# Patient Record
Sex: FEMALE | Race: WHITE | NOT HISPANIC OR LATINO | Employment: OTHER | URBAN - METROPOLITAN AREA
[De-identification: names, ages, dates, MRNs, and addresses within clinical notes are randomized per-mention and may not be internally consistent; named-entity substitution may affect disease eponyms.]

---

## 2017-01-14 ENCOUNTER — TELEPHONE (OUTPATIENT)
Dept: FAMILY MEDICINE | Facility: OTHER | Age: 57
End: 2017-01-14

## 2017-01-14 NOTE — TELEPHONE ENCOUNTER
Patient called this morning, says that the pharmacy needs authorization for her prescription.  She hasn't heard anything since Tuesday.    Please contact this patient.  Thank you!  Ny DUQUE  Central Scheduler

## 2017-01-16 NOTE — TELEPHONE ENCOUNTER
I am assuming this is regarding the Effexor. Please see encounter from 1/10/17.    Rahel Fitch, RN, BSN

## 2017-01-19 NOTE — PROGRESS NOTES
"  SUBJECTIVE:                                                    Leia Kingston is a 56 year old female who presents to clinic today for the following health issues:  {Provider please address medication reconciliation discrepancies--rooming staff please delete if no med/rec issues}    HPI    {additional problems for roomer to add, delete if none:083790}    Problem list and histories reviewed & adjusted, as indicated.  Additional history: {NONE - AS DOCUMENTED:986288::\"as documented\"}    {ACUTE Problem SUPERLIST - brief histories:445952}    {HIST REVIEW/ LINKS 2:556478}    {PROVIDER CHARTING PREFERENCE:965953}  "

## 2017-01-24 ENCOUNTER — OFFICE VISIT (OUTPATIENT)
Dept: FAMILY MEDICINE | Facility: OTHER | Age: 57
End: 2017-01-24
Payer: COMMERCIAL

## 2017-01-24 VITALS
RESPIRATION RATE: 16 BRPM | BODY MASS INDEX: 27.56 KG/M2 | HEIGHT: 61 IN | WEIGHT: 146 LBS | DIASTOLIC BLOOD PRESSURE: 88 MMHG | SYSTOLIC BLOOD PRESSURE: 134 MMHG | TEMPERATURE: 97.5 F | HEART RATE: 60 BPM

## 2017-01-24 DIAGNOSIS — Z00.00 ENCOUNTER FOR ROUTINE ADULT HEALTH EXAMINATION WITHOUT ABNORMAL FINDINGS: Primary | ICD-10-CM

## 2017-01-24 DIAGNOSIS — Z11.59 NEED FOR HEPATITIS C SCREENING TEST: ICD-10-CM

## 2017-01-24 DIAGNOSIS — N95.1 MENOPAUSAL SYNDROME (HOT FLASHES): ICD-10-CM

## 2017-01-24 LAB
CHOLEST SERPL-MCNC: 168 MG/DL
GLUCOSE SERPL-MCNC: 95 MG/DL (ref 70–99)
HCV AB SERPL QL IA: NORMAL
HDLC SERPL-MCNC: 45 MG/DL
LDLC SERPL CALC-MCNC: 94 MG/DL
NONHDLC SERPL-MCNC: 123 MG/DL
TRIGL SERPL-MCNC: 144 MG/DL
TSH SERPL DL<=0.005 MIU/L-ACNC: 1.69 MU/L (ref 0.4–4)

## 2017-01-24 PROCEDURE — 99396 PREV VISIT EST AGE 40-64: CPT | Performed by: FAMILY MEDICINE

## 2017-01-24 PROCEDURE — 80061 LIPID PANEL: CPT | Performed by: FAMILY MEDICINE

## 2017-01-24 PROCEDURE — 86803 HEPATITIS C AB TEST: CPT | Performed by: FAMILY MEDICINE

## 2017-01-24 PROCEDURE — 84443 ASSAY THYROID STIM HORMONE: CPT | Performed by: FAMILY MEDICINE

## 2017-01-24 PROCEDURE — 36415 COLL VENOUS BLD VENIPUNCTURE: CPT | Performed by: FAMILY MEDICINE

## 2017-01-24 PROCEDURE — 82947 ASSAY GLUCOSE BLOOD QUANT: CPT | Performed by: FAMILY MEDICINE

## 2017-01-24 RX ORDER — VENLAFAXINE HYDROCHLORIDE 75 MG/1
75 CAPSULE, EXTENDED RELEASE ORAL DAILY
Qty: 90 CAPSULE | Refills: 3 | Status: SHIPPED | OUTPATIENT
Start: 2017-01-24 | End: 2018-02-01

## 2017-01-24 ASSESSMENT — ANXIETY QUESTIONNAIRES
3. WORRYING TOO MUCH ABOUT DIFFERENT THINGS: NOT AT ALL
2. NOT BEING ABLE TO STOP OR CONTROL WORRYING: NOT AT ALL
1. FEELING NERVOUS, ANXIOUS, OR ON EDGE: NOT AT ALL
7. FEELING AFRAID AS IF SOMETHING AWFUL MIGHT HAPPEN: NOT AT ALL
GAD7 TOTAL SCORE: 0
5. BEING SO RESTLESS THAT IT IS HARD TO SIT STILL: NOT AT ALL
6. BECOMING EASILY ANNOYED OR IRRITABLE: NOT AT ALL

## 2017-01-24 ASSESSMENT — PATIENT HEALTH QUESTIONNAIRE - PHQ9: 5. POOR APPETITE OR OVEREATING: NOT AT ALL

## 2017-01-24 NOTE — PROGRESS NOTES
SUBJECTIVE:     CC: Leia Kingston is an 56 year old woman who presents for preventive health visit.     Physical  Annual:     Getting at least 3 servings of Calcium per day::  Yes    Bi-annual eye exam::  Yes    Dental care twice a year::  Yes    Sleep apnea or symptoms of sleep apnea::  Excessive snoring    Diet::  Regular (no restrictions)    Frequency of exercise::  None    Taking medications regularly::  Yes    Medication side effects::  None    Additional concerns today::  No    Today's PHQ-2 Score:   PHQ-2 ( 1999 Pfizer) 11/18/2015   Q1: Little interest or pleasure in doing things 0   Q2: Feeling down, depressed or hopeless 0   PHQ-2 Score 0       Abuse: Current or Past(Physical, Sexual or Emotional)- No  Do you feel safe in your environment - Yes    Social History   Substance Use Topics     Smoking status: Never Smoker      Smokeless tobacco: Not on file      Comment: no smokers in household     Alcohol Use: Yes      Comment: beer or wine x3/,1 x per month     The patient does not drink >3 drinks per day nor >7 drinks per week.    Recent Labs   Lab Test  11/18/15   1107  07/09/10   1021   CHOL  200*  221*   HDL  71  56   LDL  110*  138*   TRIG  96  136   CHOLHDLRATIO   --   4.0   NHDL  129   --        Reviewed orders with patient.  Reviewed health maintenance and updated orders accordingly - Yes    Mammo Decision Support:  Patient over age 50, mutual decision to screen reflected in health maintenance.    Pertinent mammograms are reviewed under the imaging tab.  History of abnormal Pap smear: NO - age 30-65 PAP every 5 years with negative HPV co-testing recommended  All Histories reviewed and updated in Epic.    ROS:  C: NEGATIVE for fever, chills, change in weight  I: NEGATIVE for worrisome rashes, moles or lesions  E: NEGATIVE for vision changes or irritation  ENT: NEGATIVE for ear, mouth and throat problems  R: NEGATIVE for significant cough or SOB  B: NEGATIVE for masses, tenderness or  "discharge  CV: NEGATIVE for chest pain, palpitations or peripheral edema  GI: NEGATIVE for nausea, abdominal pain, heartburn, or change in bowel habits  : NEGATIVE for unusual urinary or vaginal symptoms. No vaginal bleeding.  M: NEGATIVE for significant arthralgias or myalgia  N: NEGATIVE for weakness, dizziness or paresthesias  P: NEGATIVE for changes in mood or affect     OBJECTIVE:     /88 mmHg  Pulse 60  Temp(Src) 97.5  F (36.4  C) (Temporal)  Resp 16  Ht 5' 0.98\" (1.549 m)  Wt 146 lb (66.225 kg)  BMI 27.60 kg/m2  EXAM:  GENERAL: healthy, alert and no distress  EYES: Eyes grossly normal to inspection, PERRL and conjunctivae and sclerae normal  HENT: ear canals and TM's normal, nose and mouth without ulcers or lesions  NECK: no adenopathy, no asymmetry, masses, or scars and thyroid normal to palpation  RESP: lungs clear to auscultation - no rales, rhonchi or wheezes  BREAST: normal without masses, tenderness or nipple discharge and no palpable axillary masses or adenopathy.  Radiation blue tattoo marks seen.  CV: regular rate and rhythm, normal S1 S2,  no murmur, no peripheral edema   ABDOMEN: soft, nontender, no hepatosplenomegaly, no masses and bowel sounds normal  MS: no gross musculoskeletal defects noted, no edema  SKIN: no suspicious lesions or rashes  NEURO: Normal strength and tone, mentation intact and speech normal  PSYCH: mentation appears normal, affect normal/bright    ASSESSMENT/PLAN:     1. Encounter for routine adult health examination without abnormal findings    - Lipid panel reflex to direct LDL  - Glucose  - TSH with free T4 reflex    2. Need for hepatitis C screening test    - Hepatitis C Screen Reflex to HCV RNA Quant and Genotype    3. Menopausal syndrome (hot flashes)  Hot flashes slowly improving, but still there and not fun.  Sometimes she gets nauseated with them.  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  Discussed that Effexor could elevate BP as well.  We decided to have additional readings before deciding whether to decrease Effexor or add a BP medication.  She will try to have a couple readings done in the next month.    - venlafaxine (EFFEXOR-XR) 75 MG 24 hr capsule; Take 1 capsule (75 mg) by mouth daily  Dispense: 90 capsule; Refill: 3    COUNSELING:  Reviewed preventive health counseling, as reflected in patient instructions    BP Screening:   Last 3 BP Readings:    BP Readings from Last 3 Encounters:   01/24/17 134/88   11/18/15 122/70   02/14/14 116/78       The following was recommended to the patient:  Re-screen BP within a year and recommended lifestyle modifications       reports that she has never smoked. She does not have any smokeless tobacco history on file.    Estimated body mass index is 27.6 kg/(m^2) as calculated from the following:    Height as of this encounter: 5' 0.98\" (1.549 m).    Weight as of this encounter: 146 lb (66.225 kg).   Weight management plan: Discussed healthy diet and exercise guidelines and patient will follow up in 12 months in clinic to re-evaluate.    Counseling Resources:  ATP IV Guidelines  Pooled Cohorts Equation Calculator  Breast Cancer Risk Calculator  FRAX Risk Assessment  ICSI Preventive Guidelines  Dietary Guidelines for Americans, 2010  USDA's MyPlate  ASA Prophylaxis  Lung CA Screening    Sowmya Gao MD  [facility]    "

## 2017-01-24 NOTE — MR AVS SNAPSHOT
"              After Visit Summary   1/24/2017    Leia Kingston    MRN: [id number]           Patient Information     Date Of Birth          1960        Visit Information        Provider Department      1/24/2017 7:40 AM Sowmya Gao MD [facility]        Today's Diagnoses     Encounter for routine adult health examination without abnormal findings    -  1     Need for hepatitis C screening test         Need for prophylactic vaccination and inoculation against influenza         Menopausal syndrome (hot flashes)            Follow-ups after your visit        Who to contact     If you have questions or need follow up information about today's clinic visit or your schedule please contact [facility] directly at [phone].  Normal or non-critical lab and imaging results will be communicated to you by MyChart, letter or phone within 4 business days after the clinic has received the results. If you do not hear from us within 7 days, please contact the clinic through MyChart or phone. If you have a critical or abnormal lab result, we will notify you by phone as soon as possible.  Submit refill requests through [employer] or call your pharmacy and they will forward the refill request to us. Please allow 3 business days for your refill to be completed.          Additional Information About Your Visit        MyChart Information     [employer] lets you send messages to your doctor, view your test results, renew your prescriptions, schedule appointments and more. To sign up, go to [email or web address] . Click on \"Log in\" on the left side of the screen, which will take you to the Welcome page. Then click on \"Sign up Now\" on the right side of the page.     You will be asked to enter the access code listed below, as well as some personal information. Please follow the directions to create your username and password.     Your access code is: [email or web address]  Expires: 4/24/2017  8:13 AM   " "  Your access code will  in 90 days. If you need help or a new code, please call your [place] clinic or [phone].        Care EveryWhere ID     This is your Care EveryWhere ID. This could be used by other organizations to access your [place] medical records  [id number]        Your Vitals Were     Pulse Temperature Respirations Height BMI (Body Mass Index)       60 97.5  F (36.4  C) (Temporal) 16 5' 0.98\" (1.549 m) 27.60 kg/m2        Blood Pressure from Last 3 Encounters:   17 134/88   11/18/15 122/70   14 116/78    Weight from Last 3 Encounters:   17 146 lb (66.225 kg)   11/18/15 143 lb 12.8 oz (65.227 kg)   14 142 lb (64.411 kg)              We Performed the Following     Glucose     Hepatitis C Screen Reflex to HCV RNA Quant and Genotype     Lipid panel reflex to direct LDL     TSH with free T4 reflex          Today's Medication Changes          These changes are accurate as of: 17  8:13 AM.  If you have any questions, ask your nurse or doctor.               These medicines have changed or have updated prescriptions.        Dose/Directions    venlafaxine 75 MG 24 hr capsule   Commonly known as:  EFFEXOR-XR   This may have changed:  See the new instructions.   Used for:  Menopausal syndrome (hot flashes)   Changed by:  Sowmya Gao MD        Dose:  75 mg   Take 1 capsule (75 mg) by mouth daily   Quantity:  90 capsule   Refills:  3            Where to get your medicines      These medications were sent to [facility]/pharmacy #[id number] - [place], [place] - [address]  [address], [place] [address]     Phone:  [phone]    - venlafaxine 75 MG 24 hr capsule             Primary Care Provider Office Phone # Fax #    Sowmya Gao [phone]       [facility] [address]  [facility] [address]        Thank you!     Thank you for choosing [facility]  for your care. Our goal is always to provide you " with excellent care. Hearing back from our patients is one way we can continue to improve our services. Please take a few minutes to complete the written survey that you may receive in the mail after your visit with us. Thank you!             Your Updated Medication List - Protect others around you: Learn how to safely use, store and throw away your medicines at www.disposemymeds.org.          This list is accurate as of: 1/24/17  8:13 AM.  Always use your most recent med list.                   Brand Name Dispense Instructions for use    Multi-vitamin Tabs tablet   Generic drug:  multivitamin, therapeutic with minerals      ONE TABLET DAILY       tiZANidine 4 MG tablet    ZANAFLEX         traMADol 50 MG tablet    ULTRAM         venlafaxine 75 MG 24 hr capsule    EFFEXOR-XR    90 capsule    Take 1 capsule (75 mg) by mouth daily       VITAMIN D (CHOLECALCIFEROL) PO      Take 2,000 Units by mouth daily

## 2017-01-24 NOTE — NURSING NOTE
"Chief Complaint   Patient presents with     Physical     Panel Management     mychart, height, flu, mammo, honoring choices, hep c       Initial /94 mmHg  Pulse 60  Temp(Src) 97.5  F (36.4  C) (Temporal)  Resp 16  Ht 5' 0.98\" (1.549 m)  Wt 146 lb (66.225 kg)  BMI 27.60 kg/m2 Estimated body mass index is 27.6 kg/(m^2) as calculated from the following:    Height as of this encounter: 5' 0.98\" (1.549 m).    Weight as of this encounter: 146 lb (66.225 kg).  BP completed using cuff size: marilyn Garces CMA    "

## 2017-01-25 ASSESSMENT — PATIENT HEALTH QUESTIONNAIRE - PHQ9: SUM OF ALL RESPONSES TO PHQ QUESTIONS 1-9: 0

## 2017-01-25 ASSESSMENT — ANXIETY QUESTIONNAIRES: GAD7 TOTAL SCORE: 0

## 2017-02-08 ENCOUNTER — ALLIED HEALTH/NURSE VISIT (OUTPATIENT)
Dept: FAMILY MEDICINE | Facility: OTHER | Age: 57
End: 2017-02-08
Payer: COMMERCIAL

## 2017-02-08 VITALS — DIASTOLIC BLOOD PRESSURE: 84 MMHG | SYSTOLIC BLOOD PRESSURE: 132 MMHG

## 2017-02-08 DIAGNOSIS — Z01.30 BLOOD PRESSURE CHECK: Primary | ICD-10-CM

## 2017-02-08 PROCEDURE — 99207 ZZC NO CHARGE NURSE ONLY: CPT | Performed by: FAMILY MEDICINE

## 2017-02-08 NOTE — PROGRESS NOTES
Leia Kingston is enrolled/participating in the retail pharmacy Blood Pressure Goals Achievement Program (BPGAP).  Leia Kingston was evaluated at [facility] on February 8, 2017 at which time her blood pressure was:    BP Readings from Last 3 Encounters:   02/08/17 132/84   01/24/17 134/88   11/18/15 122/70     Leia Kingston is not experiencing symptoms.    Follow-Up: BP is at goal of < 140/90mmHg (patient 18+ years of age with or without diabetes).  Recommended follow-up at pharmacy in 6 months.     Completed by:  Merissa Diamond, Pharm.D., [facility], [phone]

## 2017-02-15 ENCOUNTER — ALLIED HEALTH/NURSE VISIT (OUTPATIENT)
Dept: FAMILY MEDICINE | Facility: OTHER | Age: 57
End: 2017-02-15
Payer: COMMERCIAL

## 2017-02-15 VITALS — SYSTOLIC BLOOD PRESSURE: 130 MMHG | DIASTOLIC BLOOD PRESSURE: 82 MMHG

## 2017-02-15 DIAGNOSIS — Z01.30 BP CHECK: Primary | ICD-10-CM

## 2017-02-15 PROCEDURE — 99207 ZZC NO CHARGE NURSE ONLY: CPT | Performed by: FAMILY MEDICINE

## 2017-02-15 NOTE — MR AVS SNAPSHOT
"              After Visit Summary   2/15/2017    Leia Kingston    MRN: [id number]           Patient Information     Date Of Birth          1960        Visit Information        Provider Department      2/15/2017 2:50 PM Sowmya Gao MD [facility]        Today's Diagnoses     BP check    -  1       Follow-ups after your visit        Who to contact     If you have questions or need follow up information about today's clinic visit or your schedule please contact [facility] directly at [phone].  Normal or non-critical lab and imaging results will be communicated to you by [employer], letter or phone within 4 business days after the clinic has received the results. If you do not hear from us within 7 days, please contact the clinic through [employer] or phone. If you have a critical or abnormal lab result, we will notify you by phone as soon as possible.  Submit refill requests through [employer] or call your pharmacy and they will forward the refill request to us. Please allow 3 business days for your refill to be completed.          Additional Information About Your Visit        MyChart Information     [employer] lets you send messages to your doctor, view your test results, renew your prescriptions, schedule appointments and more. To sign up, go to [email or web address] . Click on \"Log in\" on the left side of the screen, which will take you to the Welcome page. Then click on \"Sign up Now\" on the right side of the page.     You will be asked to enter the access code listed below, as well as some personal information. Please follow the directions to create your username and password.     Your access code is: [email or web address]  Expires: 2017  8:13 AM     Your access code will  in 90 days. If you need help or a new code, please call your [facility] or [phone].        Care EveryWhere ID     This is your Care EveryWhere ID. This could be used by other " organizations to access your [place] medical records  [id number]         Blood Pressure from Last 3 Encounters:   02/15/17 130/82   02/08/17 132/84   01/24/17 134/88    Weight from Last 3 Encounters:   01/24/17 146 lb (66.2 kg)   11/18/15 143 lb 12.8 oz (65.2 kg)   02/14/14 142 lb (64.4 kg)              Today, you had the following     No orders found for display       Primary Care Provider Office Phone # Fax #    Sowmya PARKER MD [place] [phone]       [facility] [address] [address]  [facility] [address]        Thank you!     Thank you for choosing [facility]  for your care. Our goal is always to provide you with excellent care. Hearing back from our patients is one way we can continue to improve our services. Please take a few minutes to complete the written survey that you may receive in the mail after your visit with us. Thank you!             Your Updated Medication List - Protect others around you: Learn how to safely use, store and throw away your medicines at www.disposemymeds.org.          This list is accurate as of: 2/15/17  2:54 PM.  Always use your most recent med list.                   Brand Name Dispense Instructions for use    Multi-vitamin Tabs tablet   Generic drug:  multivitamin, therapeutic with minerals      ONE TABLET DAILY       tiZANidine 4 MG tablet    ZANAFLEX         traMADol 50 MG tablet    ULTRAM         venlafaxine 75 MG 24 hr capsule    EFFEXOR-XR    90 capsule    Take 1 capsule (75 mg) by mouth daily       VITAMIN D (CHOLECALCIFEROL) PO      Take 2,000 Units by mouth daily

## 2017-02-15 NOTE — PROGRESS NOTES
Leia Kingston is enrolled/participating in the retail pharmacy Blood Pressure Goals Achievement Program (BPGAP).  Leia Kingston was evaluated at [facility] on February 15, 2017 at which time her blood pressure was:    BP Readings from Last 3 Encounters:   02/15/17 130/82   02/08/17 132/84   01/24/17 134/88     Reviewed lifestyle modifications for blood pressure control and reduction: including making healthy food choices, managing weight, getting regular exercise, smoking cessation, reducing alcohol consumption, monitoring blood pressure regularly.     Leia Kingston is not experiencing symptoms.    Follow-Up: BP is at goal of < 140/90mmHg (patient 18+ years of age with or without diabetes).  Recommended follow-up at pharmacy in 6 months. Provider asking pt to come in weekly for BP checks.     Completed by:   Michelle Ross PharmD  [facility]  On behalf of [facility]

## 2017-02-22 ENCOUNTER — ALLIED HEALTH/NURSE VISIT (OUTPATIENT)
Dept: FAMILY MEDICINE | Facility: OTHER | Age: 57
End: 2017-02-22
Payer: COMMERCIAL

## 2017-02-22 VITALS — DIASTOLIC BLOOD PRESSURE: 88 MMHG | SYSTOLIC BLOOD PRESSURE: 132 MMHG | HEART RATE: 88 BPM

## 2017-02-22 DIAGNOSIS — Z01.30 BLOOD PRESSURE CHECK: Primary | ICD-10-CM

## 2017-02-22 PROCEDURE — 99207 ZZC NO CHARGE NURSE ONLY: CPT | Performed by: FAMILY MEDICINE

## 2017-02-22 NOTE — MR AVS SNAPSHOT
"              After Visit Summary   2017    Leia Kingston    MRN: [id number]           Patient Information     Date Of Birth          1960        Visit Information        Provider Department      2017 9:33 AM Sowmya Gao MD [facility]        Today's Diagnoses     Blood pressure check    -  1       Follow-ups after your visit        Who to contact     If you have questions or need follow up information about today's clinic visit or your schedule please contact [facility] directly at [phone].  Normal or non-critical lab and imaging results will be communicated to you by [employer], letter or phone within 4 business days after the clinic has received the results. If you do not hear from us within 7 days, please contact the clinic through [employer] or phone. If you have a critical or abnormal lab result, we will notify you by phone as soon as possible.  Submit refill requests through [employer] or call your pharmacy and they will forward the refill request to us. Please allow 3 business days for your refill to be completed.          Additional Information About Your Visit        MyChart Information     [employer] lets you send messages to your doctor, view your test results, renew your prescriptions, schedule appointments and more. To sign up, go to [email or web address] . Click on \"Log in\" on the left side of the screen, which will take you to the Welcome page. Then click on \"Sign up Now\" on the right side of the page.     You will be asked to enter the access code listed below, as well as some personal information. Please follow the directions to create your username and password.     Your access code is: [email or web address]  Expires: 2017  8:13 AM     Your access code will  in 90 days. If you need help or a new code, please call your [facility] or [phone].        Care EveryWhere ID     This is your Care EveryWhere ID. This could be used by " other organizations to access your [place] medical records  [id number]        Your Vitals Were     Pulse                   88            Blood Pressure from Last 3 Encounters:   02/22/17 132/88   02/15/17 130/82   02/08/17 132/84    Weight from Last 3 Encounters:   01/24/17 146 lb (66.2 kg)   11/18/15 143 lb 12.8 oz (65.2 kg)   02/14/14 142 lb (64.4 kg)              Today, you had the following     No orders found for display       Primary Care Provider Office Phone # Fax #    Sowmya PARKER MD [place] [phone]       [facility] [address]  [facility] [address]        Thank you!     Thank you for choosing [facility]  for your care. Our goal is always to provide you with excellent care. Hearing back from our patients is one way we can continue to improve our services. Please take a few minutes to complete the written survey that you may receive in the mail after your visit with us. Thank you!             Your Updated Medication List - Protect others around you: Learn how to safely use, store and throw away your medicines at www.disposemymeds.org.          This list is accurate as of: 2/22/17  9:35 AM.  Always use your most recent med list.                   Brand Name Dispense Instructions for use    Multi-vitamin Tabs tablet   Generic drug:  multivitamin, therapeutic with minerals      ONE TABLET DAILY       tiZANidine 4 MG tablet    ZANAFLEX         traMADol 50 MG tablet    ULTRAM         venlafaxine 75 MG 24 hr capsule    EFFEXOR-XR    90 capsule    Take 1 capsule (75 mg) by mouth daily       VITAMIN D (CHOLECALCIFEROL) PO      Take 2,000 Units by mouth daily

## 2017-02-22 NOTE — PROGRESS NOTES
Leia Kingston is enrolled/participating in the retail pharmacy Blood Pressure Goals Achievement Program (BPGAP).  Leia Kingston was evaluated at [facility] on February 22, 2017 at which time her blood pressure was:    BP Readings from Last 3 Encounters:   02/22/17 132/88   02/15/17 130/82   02/08/17 132/84       Leia Kingston is not experiencing symptoms.    Follow-Up: BP is at goal of < 140/90mmHg (patient 18+ years of age with or without diabetes).     Completed by:  Merissa Diamond, Pharm.D., [facility], [phone]

## 2017-03-01 ENCOUNTER — ALLIED HEALTH/NURSE VISIT (OUTPATIENT)
Dept: FAMILY MEDICINE | Facility: OTHER | Age: 57
End: 2017-03-01
Payer: COMMERCIAL

## 2017-03-01 VITALS — DIASTOLIC BLOOD PRESSURE: 84 MMHG | SYSTOLIC BLOOD PRESSURE: 132 MMHG

## 2017-03-01 DIAGNOSIS — Z01.30 BLOOD PRESSURE CHECK: Primary | ICD-10-CM

## 2017-03-01 PROCEDURE — 99207 ZZC NO CHARGE NURSE ONLY: CPT | Performed by: FAMILY MEDICINE

## 2017-03-01 NOTE — Clinical Note
Patient has been coming in weekly for BP checks.  Has had checked 4 weeks in a row.  Told patient I would send the readings and a note to provider.  If provider would like her to continue to come in weekly please call her and let her know, otherwise recommended that she come in again in 6 months and have rechecked.   -Eileen Diamond, Pharm.D., [facility], [phone]

## 2017-03-01 NOTE — PROGRESS NOTES
Leia Kingston is enrolled/participating in the retail pharmacy Blood Pressure Goals Achievement Program (BPGAP).  Leia Kingston was evaluated at [facility] on March 1, 2017 at which time her blood pressure was:    BP Readings from Last 3 Encounters:   03/01/17 132/84   02/22/17 132/88   02/15/17 130/82       Leia Kingston is not experiencing symptoms.    Follow-Up: BP is at goal of < 140/90mmHg (patient 18+ years of age with or without diabetes).  Recommended follow-up at pharmacy in 6 months.     Completed by:  Merissa Diamond, Pharm.D., [facility], [phone]

## 2017-03-01 NOTE — MR AVS SNAPSHOT
"              After Visit Summary   3/1/2017    Leia Kingston    MRN: [id number]           Patient Information     Date Of Birth          1960        Visit Information        Provider Department      3/1/2017 9:05 AM Sowmya Gao MD [facility]        Today's Diagnoses     Blood pressure check    -  1       Follow-ups after your visit        Who to contact     If you have questions or need follow up information about today's clinic visit or your schedule please contact [facility] directly at [phone].  Normal or non-critical lab and imaging results will be communicated to you by Thrinaciahart, letter or phone within 4 business days after the clinic has received the results. If you do not hear from us within 7 days, please contact the clinic through Thrinaciahart or phone. If you have a critical or abnormal lab result, we will notify you by phone as soon as possible.  Submit refill requests through Dezide or call your pharmacy and they will forward the refill request to us. Please allow 3 business days for your refill to be completed.          Additional Information About Your Visit        MyChart Information     Dezide lets you send messages to your doctor, view your test results, renew your prescriptions, schedule appointments and more. To sign up, go to [email or web address] . Click on \"Log in\" on the left side of the screen, which will take you to the Welcome page. Then click on \"Sign up Now\" on the right side of the page.     You will be asked to enter the access code listed below, as well as some personal information. Please follow the directions to create your username and password.     Your access code is: [email or web address]  Expires: 2017  8:13 AM     Your access code will  in 90 days. If you need help or a new code, please call your [facility] or [phone].        Care EveryWhere ID     This is your Care EveryWhere ID. This could be used by other " organizations to access your [facility] medical records  [id number]         Blood Pressure from Last 3 Encounters:   03/01/17 132/84   02/22/17 132/88   02/15/17 130/82    Weight from Last 3 Encounters:   01/24/17 146 lb (66.2 kg)   11/18/15 143 lb 12.8 oz (65.2 kg)   02/14/14 142 lb (64.4 kg)              Today, you had the following     No orders found for display       Primary Care Provider Office Phone # Fax #    Sowmya PARKER MD [place] [phone]       [facility] [address]  [facility] [address]        Thank you!     Thank you for choosing [facility]  for your care. Our goal is always to provide you with excellent care. Hearing back from our patients is one way we can continue to improve our services. Please take a few minutes to complete the written survey that you may receive in the mail after your visit with us. Thank you!             Your Updated Medication List - Protect others around you: Learn how to safely use, store and throw away your medicines at www.disposemymeds.org.          This list is accurate as of: 3/1/17 11:59 PM.  Always use your most recent med list.                   Brand Name Dispense Instructions for use    Multi-vitamin Tabs tablet   Generic drug:  multivitamin, therapeutic with minerals      ONE TABLET DAILY       tiZANidine 4 MG tablet    ZANAFLEX         traMADol 50 MG tablet    ULTRAM         venlafaxine 75 MG 24 hr capsule    EFFEXOR-XR    90 capsule    Take 1 capsule (75 mg) by mouth daily       VITAMIN D (CHOLECALCIFEROL) PO      Take 2,000 Units by mouth daily

## 2017-03-10 ENCOUNTER — TRANSFERRED RECORDS (OUTPATIENT)
Dept: HEALTH INFORMATION MANAGEMENT | Facility: CLINIC | Age: 57
End: 2017-03-10

## 2017-05-10 ENCOUNTER — TRANSFERRED RECORDS (OUTPATIENT)
Dept: HEALTH INFORMATION MANAGEMENT | Facility: CLINIC | Age: 57
End: 2017-05-10

## 2017-05-12 ENCOUNTER — ALLIED HEALTH/NURSE VISIT (OUTPATIENT)
Dept: FAMILY MEDICINE | Facility: OTHER | Age: 57
End: 2017-05-12
Payer: COMMERCIAL

## 2017-05-12 VITALS — DIASTOLIC BLOOD PRESSURE: 88 MMHG | SYSTOLIC BLOOD PRESSURE: 152 MMHG

## 2017-05-12 DIAGNOSIS — Z01.30 BP CHECK: Primary | ICD-10-CM

## 2017-05-12 PROCEDURE — 99207 ZZC NO CHARGE NURSE ONLY: CPT | Performed by: FAMILY MEDICINE

## 2017-05-12 NOTE — PROGRESS NOTES
Leia Kingston is enrolled/participating in the retail pharmacy Blood Pressure Goals Achievement Program (BPGAP).  Leia Kingston was evaluated at [facility] on May 12, 2017 at which time her blood pressure was:    BP Readings from Last 3 Encounters:   05/12/17 152/88   03/01/17 132/84   02/22/17 132/88     Reviewed lifestyle modifications for blood pressure control and reduction: including making healthy food choices, managing weight, getting regular exercise, smoking cessation, reducing alcohol consumption, monitoring blood pressure regularly.     Leia Kingston is not experiencing symptoms.    Follow-Up: BP is not at goal of < 140/90mmHg (patient 18+ years of age with or without diabetes), Recommended follow-up with PCP.  Routing to PCP for further review.    Recommendation to Provider: Per patient, she had a BP check done at an appointment on 5/10/17 and it was 150/98. Patient was concerned and had us check it today in pharmacy. Patient is not experiencing any symptoms. Patient advised to have recheck done in pharmacy on Monday/Tuesday. Pt informed today's results would be forwarded to her primary MD.    Completed by:     Isael Ponce PharmD  [facility]   [phone]

## 2017-05-12 NOTE — MR AVS SNAPSHOT
After Visit Summary   5/12/2017    Leia Kingston    MRN: [id number]           Patient Information     Date Of Birth          1960        Visit Information        Provider Department      5/12/2017 10:53 AM Sowmya Gao MD [facility]        Today's Diagnoses     BP check    -  1       Follow-ups after your visit        Who to contact     If you have questions or need follow up information about today's clinic visit or your schedule please contact [facility] directly at [phone].  Normal or non-critical lab and imaging results will be communicated to you by Body Centralhart, letter or phone within 4 business days after the clinic has received the results. If you do not hear from us within 7 days, please contact the clinic through dotCloudt or phone. If you have a critical or abnormal lab result, we will notify you by phone as soon as possible.  Submit refill requests through Photos to Photos or call your pharmacy and they will forward the refill request to us. Please allow 3 business days for your refill to be completed.          Additional Information About Your Visit        MyChart Information     Photos to Photos gives you secure access to your electronic health record. If you see a primary care provider, you can also send messages to your care team and make appointments. If you have questions, please call your primary care clinic.  If you do not have a primary care provider, please call [phone] and they will assist you.        Care EveryWhere ID     This is your Care EveryWhere ID. This could be used by other organizations to access your [facility] medical records  [id number]         Blood Pressure from Last 3 Encounters:   05/12/17 152/88   03/01/17 132/84   02/22/17 132/88    Weight from Last 3 Encounters:   01/24/17 66.2 kg (146 lb)   11/18/15 65.2 kg (143 lb 12.8 oz)   02/14/14 64.4 kg (142 lb)              Today, you had the following     No orders found for  display       Primary Care Provider Office Phone # Fax #    Sowmya KEITH Gao [phone]       [facility] [address]  [facility] [address]        Thank you!     Thank you for choosing [facility]  for your care. Our goal is always to provide you with excellent care. Hearing back from our patients is one way we can continue to improve our services. Please take a few minutes to complete the written survey that you may receive in the mail after your visit with us. Thank you!             Your Updated Medication List - Protect others around you: Learn how to safely use, store and throw away your medicines at www.disposemymeds.org.          This list is accurate as of: 5/12/17 11:59 PM.  Always use your most recent med list.                   Brand Name Dispense Instructions for use    Multi-vitamin Tabs tablet   Generic drug:  multivitamin, therapeutic with minerals      ONE TABLET DAILY       tiZANidine 4 MG tablet    ZANAFLEX         traMADol 50 MG tablet    ULTRAM         venlafaxine 75 MG 24 hr capsule    EFFEXOR-XR    90 capsule    Take 1 capsule (75 mg) by mouth daily       VITAMIN D (CHOLECALCIFEROL) PO      Take 2,000 Units by mouth daily

## 2017-05-17 ENCOUNTER — ALLIED HEALTH/NURSE VISIT (OUTPATIENT)
Dept: FAMILY MEDICINE | Facility: OTHER | Age: 57
End: 2017-05-17
Payer: COMMERCIAL

## 2017-05-17 VITALS — SYSTOLIC BLOOD PRESSURE: 135 MMHG | DIASTOLIC BLOOD PRESSURE: 85 MMHG

## 2017-05-17 DIAGNOSIS — Z01.30 BP CHECK: Primary | ICD-10-CM

## 2017-05-17 PROCEDURE — 99207 ZZC NO CHARGE NURSE ONLY: CPT | Performed by: FAMILY MEDICINE

## 2017-05-17 NOTE — MR AVS SNAPSHOT
After Visit Summary   5/17/2017    Leia Kingston    MRN: [id number]           Patient Information     Date Of Birth          1960        Visit Information        Provider Department      5/17/2017 10:11 AM Sowmya Gao MD [facility]        Today's Diagnoses     BP check    -  1       Follow-ups after your visit        Who to contact     If you have questions or need follow up information about today's clinic visit or your schedule please contact [facility] directly at [phone].  Normal or non-critical lab and imaging results will be communicated to you by NanoPowershart, letter or phone within 4 business days after the clinic has received the results. If you do not hear from us within 7 days, please contact the clinic through DBVut or phone. If you have a critical or abnormal lab result, we will notify you by phone as soon as possible.  Submit refill requests through UP Online or call your pharmacy and they will forward the refill request to us. Please allow 3 business days for your refill to be completed.          Additional Information About Your Visit        MyChart Information     UP Online gives you secure access to your electronic health record. If you see a primary care provider, you can also send messages to your care team and make appointments. If you have questions, please call your primary care clinic.  If you do not have a primary care provider, please call [phone] and they will assist you.        Care EveryWhere ID     This is your Care EveryWhere ID. This could be used by other organizations to access your [facility] medical records  [id number]         Blood Pressure from Last 3 Encounters:   05/17/17 135/85   05/12/17 152/88   03/01/17 132/84    Weight from Last 3 Encounters:   01/24/17 146 lb (66.2 kg)   11/18/15 143 lb 12.8 oz (65.2 kg)   02/14/14 142 lb (64.4 kg)              Today, you had the following     No orders found for  display       Primary Care Provider Office Phone # Fax #    Sowmya KEITH Gao [phone]       [facility] [address]  [facility] [address]        Thank you!     Thank you for choosing [facility]  for your care. Our goal is always to provide you with excellent care. Hearing back from our patients is one way we can continue to improve our services. Please take a few minutes to complete the written survey that you may receive in the mail after your visit with us. Thank you!             Your Updated Medication List - Protect others around you: Learn how to safely use, store and throw away your medicines at www.disposemymeds.org.          This list is accurate as of: 5/17/17 10:13 AM.  Always use your most recent med list.                   Brand Name Dispense Instructions for use    Multi-vitamin Tabs tablet   Generic drug:  multivitamin, therapeutic with minerals      ONE TABLET DAILY       tiZANidine 4 MG tablet    ZANAFLEX         traMADol 50 MG tablet    ULTRAM         venlafaxine 75 MG 24 hr capsule    EFFEXOR-XR    90 capsule    Take 1 capsule (75 mg) by mouth daily       VITAMIN D (CHOLECALCIFEROL) PO      Take 2,000 Units by mouth daily

## 2017-05-17 NOTE — PROGRESS NOTES
"Leia Kingston is enrolled/participating in the retail pharmacy Blood Pressure Goals Achievement Program (BPGAP).  Leia Kingston was evaluated at [facility] on May 17, 2017 at which time her blood pressure was:    BP Readings from Last 3 Encounters:   05/17/17 135/85   05/12/17 152/88   03/01/17 132/84     Reviewed lifestyle modifications for blood pressure control and reduction: including making healthy food choices, managing weight, getting regular exercise, smoking cessation, reducing alcohol consumption, monitoring blood pressure regularly.     Leia Kingston is not experiencing symptoms.    Follow-Up: BP is at goal of < 140/90mmHg (patient 18+ years of age with or without diabetes).  Has been having instances of it being high at times.  Will come in weekly to have checked.  Gave handout, \"ASK about high blood pressure\".    Completed by:  -Eileen Diamond, Pharm.D., [facility], [phone]  "

## 2017-05-24 ENCOUNTER — ALLIED HEALTH/NURSE VISIT (OUTPATIENT)
Dept: FAMILY MEDICINE | Facility: OTHER | Age: 57
End: 2017-05-24
Payer: COMMERCIAL

## 2017-05-24 VITALS — SYSTOLIC BLOOD PRESSURE: 128 MMHG | DIASTOLIC BLOOD PRESSURE: 84 MMHG

## 2017-05-24 DIAGNOSIS — Z01.30 BP CHECK: Primary | ICD-10-CM

## 2017-05-24 PROCEDURE — 99207 ZZC NO CHARGE NURSE ONLY: CPT | Performed by: FAMILY MEDICINE

## 2017-05-24 NOTE — MR AVS SNAPSHOT
After Visit Summary   5/24/2017    Leia Kingston    MRN: [id number]           Patient Information     Date Of Birth          1960        Visit Information        Provider Department      5/24/2017 9:43 AM Sowmya Gao MD [facility]        Today's Diagnoses     BP check    -  1       Follow-ups after your visit        Who to contact     If you have questions or need follow up information about today's clinic visit or your schedule please contact [facility] directly at [phone].  Normal or non-critical lab and imaging results will be communicated to you by Muecshart, letter or phone within 4 business days after the clinic has received the results. If you do not hear from us within 7 days, please contact the clinic through Applied Proteomicst or phone. If you have a critical or abnormal lab result, we will notify you by phone as soon as possible.  Submit refill requests through MassBioEd or call your pharmacy and they will forward the refill request to us. Please allow 3 business days for your refill to be completed.          Additional Information About Your Visit        MyChart Information     MassBioEd gives you secure access to your electronic health record. If you see a primary care provider, you can also send messages to your care team and make appointments. If you have questions, please call your primary care clinic.  If you do not have a primary care provider, please call [phone] and they will assist you.        Care EveryWhere ID     This is your Care EveryWhere ID. This could be used by other organizations to access your [facility] medical records  [id number]         Blood Pressure from Last 3 Encounters:   05/24/17 128/84   05/17/17 135/85   05/12/17 152/88    Weight from Last 3 Encounters:   01/24/17 146 lb (66.2 kg)   11/18/15 143 lb 12.8 oz (65.2 kg)   02/14/14 142 lb (64.4 kg)              Today, you had the following     No orders found for  display       Primary Care Provider Office Phone # Fax #    Sowmya KEITH Gao [phone]       [facility] [address]  [place] [address]        Thank you!     Thank you for choosing [facility]  for your care. Our goal is always to provide you with excellent care. Hearing back from our patients is one way we can continue to improve our services. Please take a few minutes to complete the written survey that you may receive in the mail after your visit with us. Thank you!             Your Updated Medication List - Protect others around you: Learn how to safely use, store and throw away your medicines at www.disposemymeds.org.          This list is accurate as of: 5/24/17  9:44 AM.  Always use your most recent med list.                   Brand Name Dispense Instructions for use    Multi-vitamin Tabs tablet   Generic drug:  multivitamin, therapeutic with minerals      ONE TABLET DAILY       tiZANidine 4 MG tablet    ZANAFLEX         traMADol 50 MG tablet    ULTRAM         venlafaxine 75 MG 24 hr capsule    EFFEXOR-XR    90 capsule    Take 1 capsule (75 mg) by mouth daily       VITAMIN D (CHOLECALCIFEROL) PO      Take 2,000 Units by mouth daily

## 2017-05-24 NOTE — PROGRESS NOTES
Leia Kingston is enrolled/participating in the retail pharmacy Blood Pressure Goals Achievement Program (BPGAP).  Leia Kingston was evaluated at [facility] on May 24, 2017 at which time her blood pressure was:    BP Readings from Last 3 Encounters:   05/24/17 128/84   05/17/17 135/85   05/12/17 152/88     Reviewed lifestyle modifications for blood pressure control and reduction: including making healthy food choices, managing weight, getting regular exercise, smoking cessation, reducing alcohol consumption, monitoring blood pressure regularly.     Leia Kingston is not experiencing symptoms.    Follow-Up: BP is at goal of < 140/90mmHg (patient 18+ years of age with or without diabetes).  Recommended follow-up at pharmacy in 6 months.     Recommendation to Provider:    Completed by:  Merissa Diamond, Pharm.D., [facility] [place], [phone]

## 2017-11-14 ENCOUNTER — TRANSFERRED RECORDS (OUTPATIENT)
Dept: HEALTH INFORMATION MANAGEMENT | Facility: CLINIC | Age: 57
End: 2017-11-14

## 2018-02-01 DIAGNOSIS — N95.1 MENOPAUSAL SYNDROME (HOT FLASHES): ICD-10-CM

## 2018-02-02 RX ORDER — VENLAFAXINE HYDROCHLORIDE 75 MG/1
CAPSULE, EXTENDED RELEASE ORAL
Qty: 30 CAPSULE | Refills: 0 | Status: SHIPPED | OUTPATIENT
Start: 2018-02-02 | End: 2018-03-07

## 2018-02-02 NOTE — TELEPHONE ENCOUNTER
"Requested Prescriptions   Pending Prescriptions Disp Refills     venlafaxine (EFFEXOR-XR) 75 MG 24 hr capsule [Pharmacy Med Name: VENLAFAXINE HCL ER 75 MG CAP] 90 capsule 3     Sig: TAKE 1 CAPSULE (75 MG) BY MOUTH DAILY    Serotonin-Norepinephrine Reuptake Inhibitors  Failed    2/1/2018 12:40 AM       Failed - Recent or future visit with authorizing provider's specialty    Patient had office visit in the last year or has a visit in the next 30 days with authorizing provider.  See \"Patient Info\" tab in inbasket, or \"Choose Columns\" in Meds & Orders section of the refill encounter.   Last ov 01/24/2017  OVER DUE         Failed - Normal serum creatinine on file in past 12 months    Recent Labs   Lab Test  11/18/15   1107   CR  0.56     OVER DUE       Passed - Blood pressure under 140/90    BP Readings from Last 3 Encounters:   05/24/17 128/84   05/17/17 135/85   05/12/17 152/88                Passed - Patient is age 18 or older       Passed - No active pregnancy on record       Passed - No positive pregnancy test in past 12 months      Medication is being filled for 1 time refill only due to:  Patient needs to be seen because it has been more than one year since last visit.     Please call and help schedule.  Richard Azar, RN, BSN            "

## 2018-02-02 NOTE — TELEPHONE ENCOUNTER
Informed result. Appt scheduled for 3/7 with TC.  Naomi Dong, [facility]

## 2018-03-01 NOTE — PROGRESS NOTES
SUBJECTIVE:   Leia Kingston is a 57 year old female who presents to clinic today for the following health issues:      HPI  Medication Followup of Effexor - takes for hotflashes    Taking Medication as prescribed: yes    Side Effects:  None    Medication Helping Symptoms:  Yes--she states she has a couple of hot flashes a day.  She feels she is sleeping well.  She feels she is tolerating the Effexor.  She does not feel she needs a dose increase for control of her hot flashes.     Patients Rheumatologist has noticed an increase in her liver numbers.  She follows with rheumatology for fibromyalgia and has been prescribed tramadol.  She had normal AST and ALT in 2015.  The following year she was placed on tramadol and she has had mild increases of her ALT since then.  These are in the low 50s up.  Her rheumatologist asked her to follow up with her PCP.  She denies nausea vomiting diarrhea or abdominal pain.  She does have a history of breast cancer it has been over 10 years.    Problem list and histories reviewed & adjusted, as indicated.  Additional history: as documented    Patient Active Problem List   Diagnosis     Malignant neoplasm of female breast (H)     Osteopenia     Fibromyalgia     Dry eye of left side     Past Surgical History:   Procedure Laterality Date     BX/REMV,LYMPH NODE,DEEP AXILL  2007     BX/REMV,LYMPH NODE,DEEP AXILL  2007    Left axillary dissection - Breast cancer.     COLONOSCOPY  08/02/10     HC DILATION/CURETTAGE DIAG/THER NON OB      D & C x 2     HC EXCISION BREAST LESION, OPEN >=1  07/10/2007     HC TOOTH EXTRACTION W/FORCEP      wisdom teeth       Social History   Substance Use Topics     Smoking status: Never Smoker     Smokeless tobacco: Never Used      Comment: no smokers in household     Alcohol use Yes      Comment: beer or wine x3/,1 x per month     Family History   Problem Relation Age of Onset     C.A.D. Father       MI age 47     OSTEOPOROSIS  "Mother      Hypertension Mother      OSTEOPOROSIS Sister            ROS:  CONSTITUTIONAL: NEGATIVE for fever, chills, change in weight  ENT/MOUTH: NEGATIVE for ear, mouth and throat problems  RESP: NEGATIVE for significant cough or SOB  CV: NEGATIVE for chest pain, palpitations or peripheral edema    OBJECTIVE:     /80 (BP Location: Right arm, Patient Position: Chair, Cuff Size: Adult Regular)  Pulse 88  Temp 98.5  F (36.9  C) (Temporal)  Resp 16  Ht 5' 1\" (1.549 m)  Wt 145 lb (65.8 kg)  SpO2 99%  BMI 27.4 kg/m2  Body mass index is 27.4 kg/(m^2).  Gen: no apparent distress  Abd: The abdomen is soft without tenderness, guarding, mass, rebound or organomegaly. Bowel sounds are normal. No CVA tenderness.  Psych: Alert and oriented times 3; coherent speech, normal   rate and volume, able to articulate logical thoughts, able   to abstract reason, no tangential thoughts, no hallucinations   or delusions  Her affect is neutral.    ASSESSMENT/PLAN:     BMI:   Estimated body mass index is 27.4 kg/(m^2) as calculated from the following:    Height as of this encounter: 5' 1\" (1.549 m).    Weight as of this encounter: 145 lb (65.8 kg).   Weight management plan: Discussed healthy diet and exercise guidelines and patient will follow up in 12 months in clinic to re-evaluate.      1. Nonspecific abnormal results of liver function study  We will recheck liver tests as well as hepatitis and cholesterol panel.  If liver tests remain elevated would consider imaging secondary to prior history of breast cancer.    - Hepatic panel  - Hepatitis A Antibody IgG  - Hepatitis B Surface Antibody  - Hepatitis C Screen Reflex to HCV RNA Quant and Genotype  - Lipid panel reflex to direct LDL Fasting    2. Menopausal syndrome (hot flashes)  We will continue Effexor without change.    - venlafaxine (EFFEXOR-XR) 75 MG 24 hr capsule; Take 1 capsule (75 mg) by mouth daily  Dispense: 90 capsule; Refill: 3    Sowmya Gao " MD  [facility]

## 2018-03-07 ENCOUNTER — OFFICE VISIT (OUTPATIENT)
Dept: FAMILY MEDICINE | Facility: OTHER | Age: 58
End: 2018-03-07
Payer: COMMERCIAL

## 2018-03-07 VITALS
DIASTOLIC BLOOD PRESSURE: 80 MMHG | WEIGHT: 145 LBS | HEART RATE: 88 BPM | BODY MASS INDEX: 27.38 KG/M2 | RESPIRATION RATE: 16 BRPM | OXYGEN SATURATION: 99 % | HEIGHT: 61 IN | TEMPERATURE: 98.5 F | SYSTOLIC BLOOD PRESSURE: 134 MMHG

## 2018-03-07 DIAGNOSIS — R94.5 NONSPECIFIC ABNORMAL RESULTS OF LIVER FUNCTION STUDY: Primary | ICD-10-CM

## 2018-03-07 DIAGNOSIS — N95.1 MENOPAUSAL SYNDROME (HOT FLASHES): ICD-10-CM

## 2018-03-07 PROBLEM — H04.122 DRY EYE OF LEFT SIDE: Status: ACTIVE | Noted: 2017-11-15

## 2018-03-07 LAB
ALBUMIN SERPL-MCNC: 4.5 G/DL (ref 3.4–5)
ALP SERPL-CCNC: 72 U/L (ref 40–150)
ALT SERPL W P-5'-P-CCNC: 42 U/L (ref 0–50)
AST SERPL W P-5'-P-CCNC: 22 U/L (ref 0–45)
BILIRUB DIRECT SERPL-MCNC: 0.1 MG/DL (ref 0–0.2)
BILIRUB SERPL-MCNC: 0.4 MG/DL (ref 0.2–1.3)
CHOLEST SERPL-MCNC: 188 MG/DL
HAV IGG SER QL IA: REACTIVE
HBV SURFACE AB SERPL IA-ACNC: 0.62 M[IU]/ML
HCV AB SERPL QL IA: NONREACTIVE
HDLC SERPL-MCNC: 60 MG/DL
LDLC SERPL CALC-MCNC: 95 MG/DL
NONHDLC SERPL-MCNC: 128 MG/DL
PROT SERPL-MCNC: 8 G/DL (ref 6.8–8.8)
TRIGL SERPL-MCNC: 167 MG/DL

## 2018-03-07 PROCEDURE — 86708 HEPATITIS A ANTIBODY: CPT | Performed by: FAMILY MEDICINE

## 2018-03-07 PROCEDURE — 80076 HEPATIC FUNCTION PANEL: CPT | Performed by: FAMILY MEDICINE

## 2018-03-07 PROCEDURE — 99214 OFFICE O/P EST MOD 30 MIN: CPT | Performed by: FAMILY MEDICINE

## 2018-03-07 PROCEDURE — 36415 COLL VENOUS BLD VENIPUNCTURE: CPT | Performed by: FAMILY MEDICINE

## 2018-03-07 PROCEDURE — 80061 LIPID PANEL: CPT | Performed by: FAMILY MEDICINE

## 2018-03-07 PROCEDURE — 86803 HEPATITIS C AB TEST: CPT | Performed by: FAMILY MEDICINE

## 2018-03-07 PROCEDURE — 86706 HEP B SURFACE ANTIBODY: CPT | Performed by: FAMILY MEDICINE

## 2018-03-07 RX ORDER — ASPIRIN 81 MG/1
81 TABLET, CHEWABLE ORAL DAILY
Qty: 108 TABLET | Refills: 3 | COMMUNITY
Start: 2018-03-07

## 2018-03-07 RX ORDER — VENLAFAXINE HYDROCHLORIDE 75 MG/1
75 CAPSULE, EXTENDED RELEASE ORAL DAILY
Qty: 90 CAPSULE | Refills: 3 | Status: SHIPPED | OUTPATIENT
Start: 2018-03-07 | End: 2018-11-27

## 2018-03-07 NOTE — NURSING NOTE
"Chief Complaint   Patient presents with     Recheck Medication     effexor     Panel Management     height, honoring choices       Initial /80 (BP Location: Right arm, Patient Position: Chair, Cuff Size: Adult Regular)  Pulse 88  Temp 98.5  F (36.9  C) (Temporal)  Resp 16  Ht 5' 1\" (1.549 m)  Wt 145 lb (65.8 kg)  SpO2 99%  BMI 27.4 kg/m2 Estimated body mass index is 27.4 kg/(m^2) as calculated from the following:    Height as of this encounter: 5' 1\" (1.549 m).    Weight as of this encounter: 145 lb (65.8 kg).  Medication Reconciliation: complete   Mansi Ramirez CMA      "

## 2018-03-07 NOTE — MR AVS SNAPSHOT
"              After Visit Summary   3/7/2018    Leia Kingston    MRN: [id number]           Patient Information     Date Of Birth          1960        Visit Information        Provider Department      3/7/2018 9:00 AM Sowmya Gao MD [facility]        Today's Diagnoses     Nonspecific abnormal results of liver function study    -  1    Menopausal syndrome (hot flashes)           Follow-ups after your visit        Who to contact     If you have questions or need follow up information about today's clinic visit or your schedule please contact [facility] directly at [phone].  Normal or non-critical lab and imaging results will be communicated to you by MyChart, letter or phone within 4 business days after the clinic has received the results. If you do not hear from us within 7 days, please contact the clinic through [email or web address] or phone. If you have a critical or abnormal lab result, we will notify you by phone as soon as possible.  Submit refill requests through Savelli or call your pharmacy and they will forward the refill request to us. Please allow 3 business days for your refill to be completed.          Additional Information About Your Visit        MyChart Information     Savelli gives you secure access to your electronic health record. If you see a primary care provider, you can also send messages to your care team and make appointments. If you have questions, please call your primary care clinic.  If you do not have a primary care provider, please call [phone] and they will assist you.        Care EveryWhere ID     This is your Care EveryWhere ID. This could be used by other organizations to access your [facility] medical records  [id number]        Your Vitals Were     Pulse Temperature Respirations Height Pulse Oximetry BMI (Body Mass Index)    88 98.5  F (36.9  C) (Temporal) 16 5' 1\" (1.549 m) 99% 27.4 kg/m2       Blood Pressure from Last 3 " Encounters:   03/07/18 134/80   05/24/17 128/84   05/17/17 135/85    Weight from Last 3 Encounters:   03/07/18 145 lb (65.8 kg)   01/24/17 146 lb (66.2 kg)   11/18/15 143 lb 12.8 oz (65.2 kg)              We Performed the Following     Hepatic panel     Hepatitis A Antibody IgG     Hepatitis B Surface Antibody     Hepatitis C Screen Reflex to HCV RNA Quant and Genotype     Lipid panel reflex to direct LDL Fasting          Today's Medication Changes          These changes are accurate as of 3/7/18  9:38 AM.  If you have any questions, ask your nurse or doctor.               These medicines have changed or have updated prescriptions.        Dose/Directions    venlafaxine 75 MG 24 hr capsule   Commonly known as:  EFFEXOR-XR   This may have changed:  See the new instructions.   Used for:  Menopausal syndrome (hot flashes)   Changed by:  Sowmya Gao MD        Dose:  75 mg   Take 1 capsule (75 mg) by mouth daily   Quantity:  90 capsule   Refills:  3            Where to get your medicines      These medications were sent to [facility]/pharmacy #[id number] - [place], [place] - [address]  [address], [place] [address]     Phone:  [phone]     venlafaxine 75 MG 24 hr capsule                Primary Care Provider Office Phone # Fax #    Sowmya Gao [phone]       [address] [place] [address]        Equal Access to Services     [facility]: Hadii nelia day Sokaterin, waaxda luqadaha, qaybta kaalmada hyun, georges mena . So [facility] [phone].    ATENCIÓN: Si habla español, tiene a matute disposición servicios gratuitos de asistencia lingüística. Llame al [phone].    We comply with applicable federal civil rights laws and [place] laws. We do not discriminate on the basis of race, color, national origin, age, disability, sex, sexual orientation, or gender identity.            Thank you!     Thank you for choosing [facility]  for your care. Our goal is always to provide you with excellent care. Hearing back from our patients is one way we can continue to improve our services. Please take a few minutes to complete the written survey that you may receive in the mail after your visit with us. Thank you!             Your Updated Medication List - Protect others around you: Learn how to safely use, store and throw away your medicines at www.disposemymeds.org.          This list is accurate as of 3/7/18  9:38 AM.  Always use your most recent med list.                   Brand Name Dispense Instructions for use Diagnosis    aspirin 81 MG chewable tablet     108 tablet    Take 1 tablet (81 mg) by mouth daily        Multi-vitamin Tabs tablet   Generic drug:  multivitamin, therapeutic with minerals      ONE TABLET DAILY        tiZANidine 4 MG tablet    ZANAFLEX          traMADol 50 MG tablet    ULTRAM          venlafaxine 75 MG 24 hr capsule    EFFEXOR-XR    90 capsule    Take 1 capsule (75 mg) by mouth daily    Menopausal syndrome (hot flashes)       VITAMIN D (CHOLECALCIFEROL) PO      Take 2,000 Units by mouth daily

## 2018-07-25 ENCOUNTER — TRANSFERRED RECORDS (OUTPATIENT)
Dept: HEALTH INFORMATION MANAGEMENT | Facility: CLINIC | Age: 58
End: 2018-07-25

## 2018-11-27 DIAGNOSIS — N95.1 MENOPAUSAL SYNDROME (HOT FLASHES): ICD-10-CM

## 2018-11-27 RX ORDER — VENLAFAXINE HYDROCHLORIDE 75 MG/1
75 CAPSULE, EXTENDED RELEASE ORAL DAILY
Qty: 90 CAPSULE | Refills: 0 | Status: SHIPPED | OUTPATIENT
Start: 2018-11-27 | End: 2019-05-08

## 2019-01-08 ENCOUNTER — TELEPHONE (OUTPATIENT)
Dept: FAMILY MEDICINE | Facility: OTHER | Age: 59
End: 2019-01-08

## 2019-01-08 ENCOUNTER — TRANSFERRED RECORDS (OUTPATIENT)
Dept: HEALTH INFORMATION MANAGEMENT | Facility: CLINIC | Age: 59
End: 2019-01-08

## 2019-01-08 ENCOUNTER — OFFICE VISIT (OUTPATIENT)
Dept: FAMILY MEDICINE | Facility: OTHER | Age: 59
End: 2019-01-08
Payer: COMMERCIAL

## 2019-01-08 VITALS
TEMPERATURE: 98.1 F | DIASTOLIC BLOOD PRESSURE: 78 MMHG | WEIGHT: 145 LBS | HEIGHT: 61 IN | RESPIRATION RATE: 16 BRPM | HEART RATE: 96 BPM | BODY MASS INDEX: 27.38 KG/M2 | SYSTOLIC BLOOD PRESSURE: 128 MMHG | OXYGEN SATURATION: 98 %

## 2019-01-08 DIAGNOSIS — C50.912 MALIGNANT NEOPLASM OF LEFT FEMALE BREAST, UNSPECIFIED ESTROGEN RECEPTOR STATUS, UNSPECIFIED SITE OF BREAST (H): ICD-10-CM

## 2019-01-08 DIAGNOSIS — N61.0 ACUTE MASTITIS OF LEFT BREAST: Primary | ICD-10-CM

## 2019-01-08 DIAGNOSIS — N63.0 LUMP OR MASS IN BREAST: Primary | ICD-10-CM

## 2019-01-08 PROCEDURE — 99214 OFFICE O/P EST MOD 30 MIN: CPT | Performed by: FAMILY MEDICINE

## 2019-01-08 ASSESSMENT — MIFFLIN-ST. JEOR: SCORE: 1175.1

## 2019-01-08 ASSESSMENT — PAIN SCALES - GENERAL: PAINLEVEL: NO PAIN (0)

## 2019-01-08 NOTE — ASSESSMENT & PLAN NOTE
Pt with history of left breast cancer s/p lumpectomy and radiation returns to clinic complaining of pain in the left breast. She noted to have a lump to the medial side of the left nipple with skin and nipple changes raising concerns for recurrence or new malignancy. Diagnostic mammogram and ultrasound as ordered to evaluate further

## 2019-01-08 NOTE — PROGRESS NOTES
SUBJECTIVE:   Leia Kingston is a 58 year old female who presents to clinic today for the following health issues:      HPI     Concern - Breast Problem  Onset: x 1 day    Description:   Left Breast, sore, read, inflammed    Intensity: moderate, 0/10    Progression of Symptoms:  same    Accompanying Signs & Symptoms:  No drainage     Previous history of similar problem:   Yes- when nursing  Therapies Tried and outcome: None        Problem list and histories reviewed & adjusted, as indicated.  Additional history: as documented        Patient Active Problem List   Diagnosis     Malignant neoplasm of female breast (H)     Osteopenia     Fibromyalgia     Dry eye of left side     Lump or mass in breast     Past Surgical History:   Procedure Laterality Date     BX/REMV,LYMPH NODE,DEEP AXILL  2007     BX/REMV,LYMPH NODE,DEEP AXILL  2007    Left axillary dissection - Breast cancer.     COLONOSCOPY  08/02/10     HC DILATION/CURETTAGE DIAG/THER NON OB      D & C x 2     HC EXCISION BREAST LESION, OPEN >=1  07/10/2007     HC TOOTH EXTRACTION W/FORCEP      wisdom teeth       Social History     Tobacco Use     Smoking status: Never Smoker     Smokeless tobacco: Never Used     Tobacco comment: no smokers in household   Substance Use Topics     Alcohol use: Yes     Comment: beer or wine x3/,1 x per month     Family History   Problem Relation Age of Onset     C.A.D. Father          MI age 47     Osteoporosis Mother      Hypertension Mother      Osteoporosis Sister          Current Outpatient Medications   Medication Sig Dispense Refill     aspirin 81 MG chewable tablet Take 1 tablet (81 mg) by mouth daily 108 tablet 3     MULTI-VITAMIN OR TABS ONE TABLET DAILY  3     tiZANidine (ZANAFLEX) 4 MG tablet   5     traMADol (ULTRAM) 50 MG tablet        venlafaxine (EFFEXOR-XR) 75 MG 24 hr capsule Take 1 capsule (75 mg) by mouth daily 90 capsule 0     VITAMIN D, CHOLECALCIFEROL, PO Take 2,000 Units by mouth daily    "    Allergies   Allergen Reactions     No Known Drug Allergies      BP Readings from Last 3 Encounters:   01/08/19 128/78   03/07/18 134/80   05/24/17 128/84    Wt Readings from Last 3 Encounters:   01/08/19 65.8 kg (145 lb)   03/07/18 65.8 kg (145 lb)   01/24/17 66.2 kg (146 lb)                  Labs reviewed in EPIC    ROS:  Constitutional, HEENT, cardiovascular, pulmonary, gi and gu systems are negative, except as otherwise noted.    OBJECTIVE:     /78 (BP Location: Right arm, Patient Position: Chair, Cuff Size: Adult Regular)   Pulse 96   Temp 98.1  F (36.7  C) (Temporal)   Resp 16   Ht 1.549 m (5' 1\")   Wt 65.8 kg (145 lb)   SpO2 98%   BMI 27.40 kg/m    Body mass index is 27.4 kg/m .   Physical Exam   Constitutional: She appears well-developed and well-nourished.   HENT:   Head: Normocephalic.   Eyes: EOM are normal. Pupils are equal, round, and reactive to light.   Cardiovascular: Normal rate and regular rhythm.   Pulmonary/Chest: Effort normal and breath sounds normal. Right breast exhibits no inverted nipple, no nipple discharge, no skin change and no tenderness. Left breast exhibits mass, skin change and tenderness. Left breast exhibits no nipple discharge. Inverted nipple: nipple slightly retracted compared to the right.         Diagnostic Test Results:  none     ASSESSMENT/PLAN:     Problem List Items Addressed This Visit     Malignant neoplasm of female breast (H)    Lump or mass in breast - Primary     Pt with history of left breast cancer s/p lumpectomy and radiation returns to clinic complaining of pain in the left breast. She noted to have a lump to the medial side of the left nipple with skin and nipple changes raising concerns for recurrence or new malignancy. Diagnostic mammogram and ultrasound as ordered to evaluate further         Relevant Orders    MA Diagnostic Digital Bilateral        Candy Mcgovern MD  [facility]"

## 2019-01-08 NOTE — TELEPHONE ENCOUNTER
Reason for Call:  Other prescription    Detailed comments: [facility], Latoya, stating that radiologist stated patient needs to be put on antibiotics. Her left breast is warm, red and painful.   Pt uses "[facility]" pharmacy     Phone Number Patient can be reached at: Cell number on file:    Telephone Information:   Mobile [phone]       Best Time: any    Latoya would like a call like a call back at [facility] to make sure this has been addressed. Please call her back at [phone]      Call taken on 1/8/2019 at 1:52 PM by Celia Whitney

## 2019-01-08 NOTE — TELEPHONE ENCOUNTER
Augmentin sent to pharmacy.  If not improving needs to be seen for follow-up.     Maite Kim PA-C

## 2019-01-14 NOTE — PROGRESS NOTES
SUBJECTIVE:   Leia Kingston is a 58 year old female who presents to clinic today for the following health issues:      HPI  Concern - Possible infection in left breast  Onset: A little over two weeks     Description:   Skin is discolored around the areola, breast is tender and can feel a little lump.     Intensity: mild    Progression of Symptoms:  Improved after ABX use    Accompanying Signs & Symptoms:  NA    Previous history of similar problem:   Patient had breast cancer in left breast but is now in remission    Precipitating factors:   Worsened by: NA    Alleviating factors:  Improved by: ABX helped     Therapies Tried and outcome: Antibiotic was prescribed and patient states that it helped     Detailed comments: [facility], stating that radiologist stated patient needs to be put on antibiotics. Her left breast is warm, red and painful.   Pt uses [facility] pharmacy     Patient noticed a breast mass a couple of weeks ago and had a mammogram and ultrasound.  Is felt that she had an infection and she was placed on Augmentin.  She feels that the warmth and redness has decreased but the mass persists and has noticed that her nipple is retracted.  Patient has a history of breast cancer and is concerned.    Problem list and histories reviewed & adjusted, as indicated.  Additional history: as documented    Patient Active Problem List   Diagnosis     Malignant neoplasm of female breast (H)     Osteopenia     Fibromyalgia     Dry eye of left side     Lump or mass in breast     Past Surgical History:   Procedure Laterality Date     BX/REMV,LYMPH NODE,DEEP AXILL  07/31/2007     BX/REMV,LYMPH NODE,DEEP AXILL  09/04/2007    Left axillary dissection - Breast cancer.     COLONOSCOPY  08/02/10     HC DILATION/CURETTAGE DIAG/THER NON OB      D & C x 2     HC EXCISION BREAST LESION, OPEN >=1  07/10/2007     HC TOOTH EXTRACTION W/FORCEP      wisdom teeth       Social History     Tobacco Use      "Smoking status: Never Smoker     Smokeless tobacco: Never Used     Tobacco comment: no smokers in household   Substance Use Topics     Alcohol use: Yes     Comment: beer or wine x3/,1 x per month     Family History   Problem Relation Age of Onset     C.A.D. Father          MI age 47     Osteoporosis Mother      Hypertension Mother      Osteoporosis Sister            ROS:  CONSTITUTIONAL: NEGATIVE for fever, chills, change in weight  RESP: NEGATIVE for significant cough or shortness of breath  CV: NEGATIVE for chest pain, palpitations or peripheral edema    OBJECTIVE:     /88 (BP Location: Right arm, Patient Position: Chair, Cuff Size: Adult Regular)   Pulse 92   Temp 97.1  F (36.2  C) (Temporal)   Resp 16   Ht 1.537 m (5' 0.5\")   Wt 67 kg (147 lb 9.6 oz)   SpO2 99%   Breastfeeding? No   BMI 28.35 kg/m    Body mass index is 28.35 kg/m .  Gen: no apparent distress  Left breast: No redness or overlying skin disruption noted.  Nipple is retracted especially when compared to the right side.  On exam there is a 2-1/2 cm mass at approximately 10:00 which is smooth and nontender.  No nipple discharge.  No axillary adenopathy.    ASSESSMENT/PLAN:     1. Breast mass  Suspect this is infection and possibly an abscess.  However she also has a history of breast cancer.  Will refer to surgery.  We will also treat with doxycycline.    - GENERAL SURG ADULT REFERRAL  - doxycycline hyclate (VIBRA-TABS) 100 MG tablet; Take 1 tablet (100 mg) by mouth 2 times daily for 10 days  Dispense: 20 tablet; Refill: 0    Sowmya Gao MD  [facility]"

## 2019-01-25 ENCOUNTER — OFFICE VISIT (OUTPATIENT)
Dept: FAMILY MEDICINE | Facility: OTHER | Age: 59
End: 2019-01-25
Payer: COMMERCIAL

## 2019-01-25 VITALS
DIASTOLIC BLOOD PRESSURE: 88 MMHG | RESPIRATION RATE: 16 BRPM | SYSTOLIC BLOOD PRESSURE: 148 MMHG | HEIGHT: 61 IN | HEART RATE: 92 BPM | OXYGEN SATURATION: 99 % | TEMPERATURE: 97.1 F | BODY MASS INDEX: 27.87 KG/M2 | WEIGHT: 147.6 LBS

## 2019-01-25 DIAGNOSIS — N63.0 BREAST MASS: Primary | ICD-10-CM

## 2019-01-25 PROCEDURE — 99213 OFFICE O/P EST LOW 20 MIN: CPT | Performed by: FAMILY MEDICINE

## 2019-01-25 RX ORDER — DOXYCYCLINE HYCLATE 100 MG
100 TABLET ORAL 2 TIMES DAILY
Qty: 20 TABLET | Refills: 0 | Status: SHIPPED | OUTPATIENT
Start: 2019-01-25 | End: 2019-02-04

## 2019-01-25 ASSESSMENT — MIFFLIN-ST. JEOR: SCORE: 1178.95

## 2019-01-28 ENCOUNTER — OFFICE VISIT (OUTPATIENT)
Dept: SURGERY | Facility: OTHER | Age: 59
End: 2019-01-28
Payer: COMMERCIAL

## 2019-01-28 VITALS
BODY MASS INDEX: 27.75 KG/M2 | SYSTOLIC BLOOD PRESSURE: 144 MMHG | WEIGHT: 147 LBS | DIASTOLIC BLOOD PRESSURE: 97 MMHG | HEIGHT: 61 IN

## 2019-01-28 DIAGNOSIS — Z17.421 TRIPLE NEGATIVE MALIGNANT NEOPLASM OF BREAST (H): ICD-10-CM

## 2019-01-28 DIAGNOSIS — N61.0 MASTITIS IN FEMALE: Primary | ICD-10-CM

## 2019-01-28 DIAGNOSIS — C50.919 TRIPLE NEGATIVE MALIGNANT NEOPLASM OF BREAST (H): ICD-10-CM

## 2019-01-28 DIAGNOSIS — Z98.890 S/P LUMPECTOMY, LEFT BREAST: ICD-10-CM

## 2019-01-28 DIAGNOSIS — Z17.1 MALIGNANT NEOPLASM OF UPPER-OUTER QUADRANT OF LEFT BREAST IN FEMALE, ESTROGEN RECEPTOR NEGATIVE (H): ICD-10-CM

## 2019-01-28 DIAGNOSIS — C50.412 MALIGNANT NEOPLASM OF UPPER-OUTER QUADRANT OF LEFT BREAST IN FEMALE, ESTROGEN RECEPTOR NEGATIVE (H): ICD-10-CM

## 2019-01-28 PROCEDURE — 99243 OFF/OP CNSLTJ NEW/EST LOW 30: CPT | Performed by: SURGERY

## 2019-01-28 ASSESSMENT — PAIN SCALES - GENERAL: PAINLEVEL: MILD PAIN (2)

## 2019-01-28 ASSESSMENT — MIFFLIN-ST. JEOR: SCORE: 1184.17

## 2019-01-28 NOTE — LETTER
1/28/2019         RE: Leia Kingston  [address]  [facility] [address]        Dear Colleague,    Thank you for referring your patient, Leia Kingston, to the [facility]. Please see a copy of my visit note below.    General Surgery Consultation    Leia Kingston MRN# [id number]   Age: 58 year old YOB: 1960     Reason for consult: Left breast mastitis                        Assessment and Plan:   I was asked to see this patient at the request of Dr. Gao for evaluation of left breast mastitis.  Leia Kingston is a 58 year old female who presented with history, exam, laboratory and imaging most consistent with:        ICD-10-CM    1. Mastitis in female N61.0 US Breast Left Complete 4 Quadrants   2. S/P lumpectomy, left breast Z98.890    3. Malignant neoplasm of upper-outer quadrant of left breast in female, estrogen receptor negative (H) C50.412     Z17.1    4. Triple negative malignant neoplasm of breast (H) C50.919      I reassured Leia that the area of her left breast mass is due to solving mastitis and not related to a malignancy.  I reviewed the read of her imaging provided by [facility] imaging..  I do not recommend additional imaging or surgical intervention at this time.  Patient already completed her Augmentin and was started on doxycycline.  I feel there is no need for additional antibiotics at this time.  Recommend an ultrasound of the left breast in 3 months.  In the meantime patient is to call me if the mass increase in size, redness, pain.  Can order imaging much sooner if any of the above happens.  All of her questions were answered to her satisfaction.    I thank Dr. Gao for the opportunity to participate in the patient's care.           Chief Complaint:   Left breast mastitis     History is obtained from the patient         History of Present Illness:   This patient is a 58 year old  female with a  significant past medical history of left breast triple negative breast cancer status post partial mastectomy with axillary dissection followed by adjuvant chemo and radiation all done in 2008.   Leia stated she noted pain on the left breast starting on January 8 she also noted redness to this area.  Was seen by her primary care provider, was initially not placed on antibiotics however an ultrasound was done and noted that patient has a thickened tissue within this area however no discernible mass or breast tissue distortion to this area on ultrasound and mammogram.  Patient was then placed on antibiotics and the redness has improved.  Pain also has improved since.  However patient is able to still feel a palpable mass at this site.  Patient denies any nipple discharge.  However patient noted her nipple has inverted somewhat.  Patient denies history of smoking.  Patient have had no issue with residual breast cancer or recurrent breast cancer.  She is quite concerned that this is a recurrent of her breast cancer.       Past Medical History:    has a past medical history of Malignant neoplasm of breast (female), unspecified site.          Past Surgical History:     Past Surgical History:   Procedure Laterality Date     BX/REMV,LYMPH NODE,DEEP AXILL  07/31/2007     BX/REMV,LYMPH NODE,DEEP AXILL  09/04/2007    Left axillary dissection - Breast cancer.     COLONOSCOPY  08/02/10     HC DILATION/CURETTAGE DIAG/THER NON OB      D & C x 2     HC EXCISION BREAST LESION, OPEN >=1  07/10/2007     HC TOOTH EXTRACTION W/FORCEP      wisdom teeth           Medications:     Current Outpatient Medications on File Prior to Visit:  aspirin 81 MG chewable tablet Take 1 tablet (81 mg) by mouth daily   doxycycline hyclate (VIBRA-TABS) 100 MG tablet Take 1 tablet (100 mg) by mouth 2 times daily for 10 days   MULTI-VITAMIN OR TABS ONE TABLET DAILY   tiZANidine (ZANAFLEX) 4 MG tablet    traMADol (ULTRAM) 50 MG tablet    venlafaxine  (EFFEXOR-XR) 75 MG 24 hr capsule Take 1 capsule (75 mg) by mouth daily   VITAMIN D, CHOLECALCIFEROL, PO Take 2,000 Units by mouth daily     No current facility-administered medications on file prior to visit.       Allergies:      Allergies   Allergen Reactions     No Known Drug Allergies             Social History:   Leia Kingston  reports that  has never smoked. she has never used smokeless tobacco. She reports that she drinks alcohol. She reports that she does not use drugs.          Family History:   The patient has no family history of any bleeding, clotting or anesthesia problems.          Review of Systems:     Constitutional: Denies fever or chills   Eyes: Denies change in visual acuity   HENT: Denies nasal congestion or sore throat   Respiratory: Denies cough or shortness of breath   Cardiovascular: Denies chest pain or edema   GI: Denies abdominal pain, nausea, vomiting, bloody stools or diarrhea   : Denies dysuria   Musculoskeletal: Denies back pain or joint pain   Integument: Denies rash   Neurologic: Denies headache, focal weakness or sensory changes   Endocrine: Denies polyuria or polydipsia   Lymphatic: Denies swollen glands   Psychiatric: Denies depression or anxiety          Physical Exam:     Constitutional: Awake, alert, no acute distress.  Eyes:  No scleral icterus.  Conjunctiva are without injection.  ENMT: Mucous membranes moist, dentition and gums are intact.   Neck: Soft, supple, trachea midline.    Endocrine: The thyroid is without masses and mobile with swallow.   Lymphatic: There is no cervical, submandibular, or inguinal adenopathy.  Respiratory: Lungs are clear to auscultation and percussion bilaterally.   Cardiovascular: Regular rate and rhythm. No murmurs, rubs, or gallops.    Abdomen: Non-distended, non-tender, normoactive bowel sounds present, No masses, neg hernias, or flank tenderness. No hepatosplenomegaly.   Breast Exam:     RIGHT: normal without suspicious masses, skin  changes or axillary nodes, symmetric fibrous changes in both upper outer quadrants, self exam is taught and encouraged.    LEFT:symmetric fibrous changes in both upper outer quadrants, self exam is taught and encouraged, mass of size 2 cm noted in at 9 oclock 2cm from NAC; no skin changes; nipple normal in appearance but a tad sunken in.  No redness.  No tenderness on palpation.   Musculoskeletal: No spinal or CVA tenderness. Full range of motion in the upper and lower extremities.    Skin: No skin rashes or lesions to inspection.  No petechia.    Neurologic: Cranial nerves II through XII are grossly intact and symmetric.  Psychiatric: The patient is alert and oriented times 3.  The patient's affect is not blunted and mood is appropriate.          Data:   WBC -   WBC   Date Value Ref Range Status   01/06/2016 4.5 4.0 - 11.0 10e9/L Final   ], HgB -   Hemoglobin   Date Value Ref Range Status   11/18/2015 15.0 11.7 - 15.7 g/dL Final   ]   Liver Function Studies -   Recent Labs   Lab Test 03/07/18  0942   PROTTOTAL 8.0   ALBUMIN 4.5   BILITOTAL 0.4   ALKPHOS 72   AST 22   ALT 42     No results found for this or any previous visit (from the past 744 hour(s)).     [facility] DO Brii 1/30/2019 10:36 AM           Again, thank you for allowing me to participate in the care of your patient.        Sincerely,        [facility] MD Brii

## 2019-01-30 NOTE — PROGRESS NOTES
General Surgery Consultation    Leia Kingston MRN# [id number]   Age: 58 year old YOB: 1960     Reason for consult: Left breast mastitis                        Assessment and Plan:   I was asked to see this patient at the request of Dr. Gao for evaluation of left breast mastitis.  Leia Kingston is a 58 year old female who presented with history, exam, laboratory and imaging most consistent with:        ICD-10-CM    1. Mastitis in female N61.0 US Breast Left Complete 4 Quadrants   2. S/P lumpectomy, left breast Z98.890    3. Malignant neoplasm of upper-outer quadrant of left breast in female, estrogen receptor negative (H) C50.412     Z17.1    4. Triple negative malignant neoplasm of breast (H) C50.919      I reassured Leia that the area of her left breast mass is due to solving mastitis and not related to a malignancy.  I reviewed the read of her imaging provided by [facility] imaging..  I do not recommend additional imaging or surgical intervention at this time.  Patient already completed her Augmentin and was started on doxycycline.  I feel there is no need for additional antibiotics at this time.  Recommend an ultrasound of the left breast in 3 months.  In the meantime patient is to call me if the mass increase in size, redness, pain.  Can order imaging much sooner if any of the above happens.  All of her questions were answered to her satisfaction.    I thank Dr. Gao for the opportunity to participate in the patient's care.           Chief Complaint:   Left breast mastitis     History is obtained from the patient         History of Present Illness:   This patient is a 58 year old  female with a significant past medical history of left breast triple negative breast cancer status post partial mastectomy with axillary dissection followed by adjuvant chemo and radiation all done in 2008.   Leia stated she noted pain on the left breast starting on January  8 she also noted redness to this area.  Was seen by her primary care provider, was initially not placed on antibiotics however an ultrasound was done and noted that patient has a thickened tissue within this area however no discernible mass or breast tissue distortion to this area on ultrasound and mammogram.  Patient was then placed on antibiotics and the redness has improved.  Pain also has improved since.  However patient is able to still feel a palpable mass at this site.  Patient denies any nipple discharge.  However patient noted her nipple has inverted somewhat.  Patient denies history of smoking.  Patient have had no issue with residual breast cancer or recurrent breast cancer.  She is quite concerned that this is a recurrent of her breast cancer.       Past Medical History:    has a past medical history of Malignant neoplasm of breast (female), unspecified site.          Past Surgical History:     Past Surgical History:   Procedure Laterality Date     BX/REMV,LYMPH NODE,DEEP AXILL  07/31/2007     BX/REMV,LYMPH NODE,DEEP AXILL  09/04/2007    Left axillary dissection - Breast cancer.     COLONOSCOPY  08/02/10     HC DILATION/CURETTAGE DIAG/THER NON OB      D & C x 2     HC EXCISION BREAST LESION, OPEN >=1  07/10/2007     HC TOOTH EXTRACTION W/FORCEP      wisdom teeth           Medications:     Current Outpatient Medications on File Prior to Visit:  aspirin 81 MG chewable tablet Take 1 tablet (81 mg) by mouth daily   doxycycline hyclate (VIBRA-TABS) 100 MG tablet Take 1 tablet (100 mg) by mouth 2 times daily for 10 days   MULTI-VITAMIN OR TABS ONE TABLET DAILY   tiZANidine (ZANAFLEX) 4 MG tablet    traMADol (ULTRAM) 50 MG tablet    venlafaxine (EFFEXOR-XR) 75 MG 24 hr capsule Take 1 capsule (75 mg) by mouth daily   VITAMIN D, CHOLECALCIFEROL, PO Take 2,000 Units by mouth daily     No current facility-administered medications on file prior to visit.       Allergies:      Allergies   Allergen Reactions     No  Known Drug Allergies             Social History:   Leia Kingston  reports that  has never smoked. she has never used smokeless tobacco. She reports that she drinks alcohol. She reports that she does not use drugs.          Family History:   The patient has no family history of any bleeding, clotting or anesthesia problems.          Review of Systems:     Constitutional: Denies fever or chills   Eyes: Denies change in visual acuity   HENT: Denies nasal congestion or sore throat   Respiratory: Denies cough or shortness of breath   Cardiovascular: Denies chest pain or edema   GI: Denies abdominal pain, nausea, vomiting, bloody stools or diarrhea   : Denies dysuria   Musculoskeletal: Denies back pain or joint pain   Integument: Denies rash   Neurologic: Denies headache, focal weakness or sensory changes   Endocrine: Denies polyuria or polydipsia   Lymphatic: Denies swollen glands   Psychiatric: Denies depression or anxiety          Physical Exam:     Constitutional: Awake, alert, no acute distress.  Eyes:  No scleral icterus.  Conjunctiva are without injection.  ENMT: Mucous membranes moist, dentition and gums are intact.   Neck: Soft, supple, trachea midline.    Endocrine: The thyroid is without masses and mobile with swallow.   Lymphatic: There is no cervical, submandibular, or inguinal adenopathy.  Respiratory: Lungs are clear to auscultation and percussion bilaterally.   Cardiovascular: Regular rate and rhythm. No murmurs, rubs, or gallops.    Abdomen: Non-distended, non-tender, normoactive bowel sounds present, No masses, neg hernias, or flank tenderness. No hepatosplenomegaly.   Breast Exam:     RIGHT: normal without suspicious masses, skin changes or axillary nodes, symmetric fibrous changes in both upper outer quadrants, self exam is taught and encouraged.    LEFT:symmetric fibrous changes in both upper outer quadrants, self exam is taught and encouraged, mass of size 2 cm noted in at 9 oclock 2cm from  NAC; no skin changes; nipple normal in appearance but a tad sunken in.  No redness.  No tenderness on palpation.   Musculoskeletal: No spinal or CVA tenderness. Full range of motion in the upper and lower extremities.    Skin: No skin rashes or lesions to inspection.  No petechia.    Neurologic: Cranial nerves II through XII are grossly intact and symmetric.  Psychiatric: The patient is alert and oriented times 3.  The patient's affect is not blunted and mood is appropriate.          Data:   WBC -   WBC   Date Value Ref Range Status   01/06/2016 4.5 4.0 - 11.0 10e9/L Final   ], HgB -   Hemoglobin   Date Value Ref Range Status   11/18/2015 15.0 11.7 - 15.7 g/dL Final   ]   Liver Function Studies -   Recent Labs   Lab Test 03/07/18  0942   PROTTOTAL 8.0   ALBUMIN 4.5   BILITOTAL 0.4   ALKPHOS 72   AST 22   ALT 42     No results found for this or any previous visit (from the past 744 hour(s)).     Ari-Mily Teresa DO 1/30/2019 10:36 AM

## 2019-03-20 ENCOUNTER — TRANSFERRED RECORDS (OUTPATIENT)
Dept: HEALTH INFORMATION MANAGEMENT | Facility: CLINIC | Age: 59
End: 2019-03-20

## 2019-04-26 ENCOUNTER — TRANSFERRED RECORDS (OUTPATIENT)
Dept: HEALTH INFORMATION MANAGEMENT | Facility: CLINIC | Age: 59
End: 2019-04-26

## 2019-05-08 DIAGNOSIS — N95.1 MENOPAUSAL SYNDROME (HOT FLASHES): ICD-10-CM

## 2019-05-08 NOTE — TELEPHONE ENCOUNTER
Effexor  Routing refill request to provider for review/approval because:  A break in medication  BP out of range  Labs not current:  creatinine      Gisela Shrestha RN, BSN

## 2019-05-10 RX ORDER — VENLAFAXINE HYDROCHLORIDE 75 MG/1
75 CAPSULE, EXTENDED RELEASE ORAL DAILY
Qty: 90 CAPSULE | Refills: 0 | Status: SHIPPED | OUTPATIENT
Start: 2019-05-10 | End: 2019-08-10

## 2019-08-10 DIAGNOSIS — N95.1 MENOPAUSAL SYNDROME (HOT FLASHES): ICD-10-CM

## 2019-08-12 RX ORDER — VENLAFAXINE HYDROCHLORIDE 75 MG/1
75 CAPSULE, EXTENDED RELEASE ORAL DAILY
Qty: 90 CAPSULE | Refills: 1 | Status: SHIPPED | OUTPATIENT
Start: 2019-08-12 | End: 2019-09-03

## 2019-08-12 NOTE — TELEPHONE ENCOUNTER
Effexor  Routing refill request to provider for review/approval because:  Labs not current:  Creatinine    Rahel Fitch, RN, BSN

## 2019-08-28 ENCOUNTER — MYC REFILL (OUTPATIENT)
Dept: FAMILY MEDICINE | Facility: OTHER | Age: 59
End: 2019-08-28

## 2019-08-28 DIAGNOSIS — N95.1 MENOPAUSAL SYNDROME (HOT FLASHES): ICD-10-CM

## 2019-08-28 RX ORDER — VENLAFAXINE HYDROCHLORIDE 75 MG/1
75 CAPSULE, EXTENDED RELEASE ORAL DAILY
Qty: 90 CAPSULE | Refills: 1 | Status: CANCELLED | OUTPATIENT
Start: 2019-08-28

## 2019-09-03 ENCOUNTER — TELEPHONE (OUTPATIENT)
Dept: FAMILY MEDICINE | Facility: OTHER | Age: 59
End: 2019-09-03

## 2019-09-03 ENCOUNTER — MYC MEDICAL ADVICE (OUTPATIENT)
Dept: FAMILY MEDICINE | Facility: OTHER | Age: 59
End: 2019-09-03

## 2019-09-03 DIAGNOSIS — N95.1 MENOPAUSAL SYNDROME (HOT FLASHES): ICD-10-CM

## 2019-09-03 RX ORDER — VENLAFAXINE HYDROCHLORIDE 75 MG/1
75 CAPSULE, EXTENDED RELEASE ORAL DAILY
Qty: 90 CAPSULE | Refills: 1 | Status: SHIPPED | OUTPATIENT
Start: 2019-09-03 | End: 2020-05-28

## 2019-09-03 NOTE — TELEPHONE ENCOUNTER
Huddled with TC she said as our pharmacy, our pharmacy has the med so we will pull it here. Informed Patient.   Lindsey Connors MA

## 2019-09-03 NOTE — TELEPHONE ENCOUNTER
"Pharmacy comments:    \"Venlafaxine (EFFEXOR-XR) 75 MG 24 hr capsule on long term backorder. Do you want to change to something else? Thank you.\"  "

## 2019-09-28 ENCOUNTER — HEALTH MAINTENANCE LETTER (OUTPATIENT)
Age: 59
End: 2019-09-28

## 2019-11-06 ENCOUNTER — TRANSFERRED RECORDS (OUTPATIENT)
Dept: HEALTH INFORMATION MANAGEMENT | Facility: CLINIC | Age: 59
End: 2019-11-06

## 2019-12-02 NOTE — PROGRESS NOTES
Subjective     Leia Kingston is a 59 year old female who presents to clinic today for the following health issues:    HPI   Vaginal Symptoms  Onset: a couple of weeks    Description:  Vaginal Discharge: Has been quite thin and blood tinged to the point that she is been wearing a pad  Itching (Pruritis): no   Burning sensation:  no   Odor: no     Accompanying Signs & Symptoms:  Pain with Urination: no   Abdominal Pain: no - low back pain  Fever: no     History:   Sexually active: no   New Partner: no   Possibility of Pregnancy:  No    Precipitating factors:   Recent Antibiotic Use: no     Alleviating factors:none      Therapies Tried and outcome:none    She denies hematuria, dysuria, urinary frequency or urinary incontinence.  She denies any bowel disorder.    Has also noticed severe itching on her scalp and a great deal of hair loss over the last couple months.  Denies unusual stress.      Patient Active Problem List   Diagnosis     Malignant neoplasm of female breast (H)     Osteopenia     Fibromyalgia     Dry eye of left side     Lump or mass in breast     Past Surgical History:   Procedure Laterality Date     BX/REMV,LYMPH NODE,DEEP AXILL  2007     BX/REMV,LYMPH NODE,DEEP AXILL  2007    Left axillary dissection - Breast cancer.     COLONOSCOPY  08/02/10     HC DILATION/CURETTAGE DIAG/THER NON OB      D & C x 2     HC EXCISION BREAST LESION, OPEN >=1  07/10/2007     HC TOOTH EXTRACTION W/FORCEP      wisdom teeth       Social History     Tobacco Use     Smoking status: Never Smoker     Smokeless tobacco: Never Used     Tobacco comment: no smokers in household   Substance Use Topics     Alcohol use: Yes     Comment: beer or wine x3/,1 x per month     Family History   Problem Relation Age of Onset     C.A.D. Father          MI age 47     Osteoporosis Mother      Hypertension Mother      Osteoporosis Sister            Reviewed and updated as needed this visit by Provider  Tobacco  Allergies   "Meds  Problems  Med Hx  Surg Hx  Fam Hx         Review of Systems   ROS COMP: CONSTITUTIONAL: NEGATIVE for fever, chills, change in weight  GI: NEGATIVE for nausea, abdominal pain, heartburn, or change in bowel habits      Objective    /76   Pulse 90   Temp 98.1  F (36.7  C) (Temporal)   Resp 14   Ht 1.549 m (5' 1\")   Wt 61.7 kg (136 lb)   SpO2 100%   BMI 25.70 kg/m    Body mass index is 25.7 kg/m .  Physical Exam   Gen: no apparent distress  Scalp: Diffuse hair thinning without any discrete areas of alopecia.  Vague pink noticed throughout the scalp but no obvious flaking or crusting.  :  Vagina and vulva are normal;  no discharge is noted.  Cervix normal without lesions. PAP done.    Results for orders placed or performed in visit on 12/03/19   Wet prep     Status: None   Result Value Ref Range    Specimen Description Vagina     Wet Prep No Trichomonas seen     Wet Prep No clue cells seen     Wet Prep No yeast seen     Wet Prep WBC'S seen  Few               Assessment & Plan     1. Vaginal discharge  She shows me pictures where it is darker at the edges of her pad but no obvious blood.  Reassured her that the wet prep was normal.  Will await Pap smear.  If this worsens or if she notices any vaginal bleeding we will then refer her to gynecology.    - Wet prep    2. Hair loss  She is already seen dermatology but has not discussed this with them.  Will obtain laboratory studies and she will bring this up with dermatology at her next appointment.    - TSH with free T4 reflex  - CBC with platelets  - Ferritin  - Iron and iron binding capacity    3. Screening for malignant neoplasm of cervix    - Pap imaged thin layer screen with HPV - recommended age 30 - 65 years (select HPV order below)  - HPV High Risk Types DNA Cervical     Sowmya Gao MD  [facility]    "

## 2019-12-03 ENCOUNTER — OFFICE VISIT (OUTPATIENT)
Dept: FAMILY MEDICINE | Facility: OTHER | Age: 59
End: 2019-12-03
Payer: COMMERCIAL

## 2019-12-03 VITALS
DIASTOLIC BLOOD PRESSURE: 76 MMHG | TEMPERATURE: 98.1 F | HEIGHT: 61 IN | WEIGHT: 136 LBS | OXYGEN SATURATION: 100 % | BODY MASS INDEX: 25.68 KG/M2 | HEART RATE: 90 BPM | RESPIRATION RATE: 14 BRPM | SYSTOLIC BLOOD PRESSURE: 118 MMHG

## 2019-12-03 DIAGNOSIS — N89.8 VAGINAL DISCHARGE: Primary | ICD-10-CM

## 2019-12-03 DIAGNOSIS — L65.9 HAIR LOSS: ICD-10-CM

## 2019-12-03 DIAGNOSIS — Z12.4 SCREENING FOR MALIGNANT NEOPLASM OF CERVIX: ICD-10-CM

## 2019-12-03 LAB
ERYTHROCYTE [DISTWIDTH] IN BLOOD BY AUTOMATED COUNT: 12.4 % (ref 10–15)
FERRITIN SERPL-MCNC: 86 NG/ML (ref 8–252)
HCT VFR BLD AUTO: 41.1 % (ref 35–47)
HGB BLD-MCNC: 14.2 G/DL (ref 11.7–15.7)
IRON SATN MFR SERPL: 23 % (ref 15–46)
IRON SERPL-MCNC: 84 UG/DL (ref 35–180)
MCH RBC QN AUTO: 31.6 PG (ref 26.5–33)
MCHC RBC AUTO-ENTMCNC: 34.5 G/DL (ref 31.5–36.5)
MCV RBC AUTO: 92 FL (ref 78–100)
PLATELET # BLD AUTO: 210 10E9/L (ref 150–450)
RBC # BLD AUTO: 4.49 10E12/L (ref 3.8–5.2)
SPECIMEN SOURCE: NORMAL
TIBC SERPL-MCNC: 371 UG/DL (ref 240–430)
TSH SERPL DL<=0.005 MIU/L-ACNC: 1.68 MU/L (ref 0.4–4)
WBC # BLD AUTO: 6 10E9/L (ref 4–11)
WET PREP SPEC: NORMAL

## 2019-12-03 PROCEDURE — 99214 OFFICE O/P EST MOD 30 MIN: CPT | Performed by: FAMILY MEDICINE

## 2019-12-03 PROCEDURE — 82728 ASSAY OF FERRITIN: CPT | Performed by: FAMILY MEDICINE

## 2019-12-03 PROCEDURE — 85027 COMPLETE CBC AUTOMATED: CPT | Performed by: FAMILY MEDICINE

## 2019-12-03 PROCEDURE — 87210 SMEAR WET MOUNT SALINE/INK: CPT | Performed by: FAMILY MEDICINE

## 2019-12-03 PROCEDURE — 83540 ASSAY OF IRON: CPT | Performed by: FAMILY MEDICINE

## 2019-12-03 PROCEDURE — 83550 IRON BINDING TEST: CPT | Performed by: FAMILY MEDICINE

## 2019-12-03 PROCEDURE — G0145 SCR C/V CYTO,THINLAYER,RESCR: HCPCS | Performed by: FAMILY MEDICINE

## 2019-12-03 PROCEDURE — 36415 COLL VENOUS BLD VENIPUNCTURE: CPT | Performed by: FAMILY MEDICINE

## 2019-12-03 PROCEDURE — 87624 HPV HI-RISK TYP POOLED RSLT: CPT | Performed by: FAMILY MEDICINE

## 2019-12-03 PROCEDURE — 84443 ASSAY THYROID STIM HORMONE: CPT | Performed by: FAMILY MEDICINE

## 2019-12-03 ASSESSMENT — MIFFLIN-ST. JEOR: SCORE: 1129.27

## 2019-12-06 LAB
COPATH REPORT: NORMAL
PAP: NORMAL

## 2019-12-09 LAB
FINAL DIAGNOSIS: NORMAL
HPV HR 12 DNA CVX QL NAA+PROBE: NEGATIVE
HPV16 DNA SPEC QL NAA+PROBE: NEGATIVE
HPV18 DNA SPEC QL NAA+PROBE: NEGATIVE
SPECIMEN DESCRIPTION: NORMAL
SPECIMEN SOURCE CVX/VAG CYTO: NORMAL

## 2020-01-22 ENCOUNTER — TELEPHONE (OUTPATIENT)
Dept: FAMILY MEDICINE | Facility: OTHER | Age: 60
End: 2020-01-22

## 2020-01-22 DIAGNOSIS — Z12.31 ENCOUNTER FOR SCREENING MAMMOGRAM FOR BREAST CANCER: Primary | ICD-10-CM

## 2020-01-22 NOTE — TELEPHONE ENCOUNTER
Spoke to patient, she declined scheduling a physical at this time. She states she has her mammograms at [facility] imaging and plans to schedule with them  Mansi Ramirez CMA

## 2020-01-22 NOTE — TELEPHONE ENCOUNTER
Panel Management Review  Patient had ultrasound of left breast done 11/2019 and updated mammogram 04/2019, can this order from 01/2019 be removed?    Patient has the following on her problem list: None      Composite cancer screening  Chart review shows that this patient is due/due soon for the following None  Summary:    Patient is due/failing the following:   MAMMOGRAM    Action needed:   Routed to provider for review.    Type of outreach:    None, routed to provider for review.    Questions for provider review:    None                                                                                                                                    Ute Aguiar [facility]       Chart routed to Provider .

## 2020-02-26 ENCOUNTER — TRANSFERRED RECORDS (OUTPATIENT)
Dept: HEALTH INFORMATION MANAGEMENT | Facility: CLINIC | Age: 60
End: 2020-02-26

## 2020-05-27 DIAGNOSIS — N95.1 MENOPAUSAL SYNDROME (HOT FLASHES): ICD-10-CM

## 2020-05-28 RX ORDER — VENLAFAXINE HYDROCHLORIDE 75 MG/1
CAPSULE, EXTENDED RELEASE ORAL
Qty: 90 CAPSULE | Refills: 1 | Status: SHIPPED | OUTPATIENT
Start: 2020-05-28 | End: 2020-11-24

## 2020-05-28 NOTE — TELEPHONE ENCOUNTER
Routing refill request to provider for review/approval because:  Labs not current:  Creatinine    Rahel Fitch, RN, BSN

## 2020-06-03 ENCOUNTER — TRANSFERRED RECORDS (OUTPATIENT)
Dept: HEALTH INFORMATION MANAGEMENT | Facility: CLINIC | Age: 60
End: 2020-06-03

## 2020-11-24 DIAGNOSIS — N95.1 MENOPAUSAL SYNDROME (HOT FLASHES): ICD-10-CM

## 2020-11-24 RX ORDER — VENLAFAXINE HYDROCHLORIDE 75 MG/1
CAPSULE, EXTENDED RELEASE ORAL
Qty: 30 CAPSULE | Refills: 0 | Status: SHIPPED | OUTPATIENT
Start: 2020-11-24 | End: 2020-12-11

## 2020-11-24 NOTE — TELEPHONE ENCOUNTER
Pending Prescriptions:                       Disp   Refills    venlafaxine (EFFEXOR-XR) 75 MG 24 hr capsu*90 cap*1        Sig: TAKE ONE CAPSULE BY MOUTH ONCE DAILY      Routing refill request to provider for review/approval because:  Labs not current:    Creatinine   Date Value Ref Range Status   11/18/2015 0.56 0.52 - 1.04 mg/dL Final         Radha Roa RN

## 2020-11-24 NOTE — TELEPHONE ENCOUNTER
Called and spoke with patient regarding message below.  Patient verbalized understanding.  Bonnie Rangel CMA ([facility])

## 2020-12-09 NOTE — PROGRESS NOTES
SUBJECTIVE:   CC: Leia Kingston is an 60 year old woman who presents for preventive health visit.     Patient has been advised of split billing requirements and indicates understanding: Yes     Healthy Habits:    Getting at least 3 servings of Calcium per day:  Yes    Bi-annual eye exam:  Yes    Dental care twice a year:  Yes    Sleep apnea or symptoms of sleep apnea:  None    Diet:  Regular (no restrictions)    Frequency of exercise:  None    Duration of exercise:  N/A    Taking medications regularly:  Yes    Barriers to taking medications:  None    Medication side effects:  None    PHQ-2 Total Score:    Additional concerns today:  No      Today's PHQ-2 Score:   PHQ-2 ( 1999 [employer]) 12/11/2020   Q1: Little interest or pleasure in doing things 0   Q2: Feeling down, depressed or hopeless 0   PHQ-2 Score 0       Abuse: Current or Past (Physical, Sexual or Emotional) - No  Do you feel safe in your environment? Yes    Have you ever done Advance Care Planning? (For example, a Health Directive, POLST, or a discussion with a medical provider or your loved ones about your wishes): No, advance care planning information given to patient to review.  Patient declined advance care planning discussion at this time.    Social History     Tobacco Use     Smoking status: Never Smoker     Smokeless tobacco: Never Used     Tobacco comment: no smokers in household   Substance Use Topics     Alcohol use: Yes     Comment: beer or wine x3/,1 x per month     If you drink alcohol do you typically have >3 drinks per day or >7 drinks per week? No    Alcohol Use 12/11/2020   Prescreen: >3 drinks/day or >7 drinks/week? No       Reviewed orders with patient.  Reviewed health maintenance and updated orders accordingly - Yes    Mammogram Screening: Patient over age 50, mutual decision to screen reflected in health maintenance.    Pertinent mammograms are reviewed under the imaging tab.  History of abnormal Pap smear: NO - age 30-65 PAP  "every 5 years with negative HPV co-testing recommended  PAP / HPV Latest Ref Rng & Units 12/3/2019 11/18/2015 7/9/2010   PAP - NIL NIL NIL   HPV 16 DNA NEG:Negative Negative Negative -   HPV 18 DNA NEG:Negative Negative Negative -   OTHER HR HPV NEG:Negative Negative Negative -     Reviewed and updated as needed this visit by clinical staff  Tobacco  Allergies  Meds  Problems  Med Hx  Surg Hx  Fam Hx  Soc Hx          Reviewed and updated as needed this visit by Provider  Tobacco  Allergies  Meds  Problems  Med Hx  Surg Hx  Fam Hx           Review of Systems   Constitutional: Negative for chills and fever.   HENT: Negative for congestion, ear pain, hearing loss and sore throat.    Eyes: Negative for pain and visual disturbance.   Respiratory: Negative for cough and shortness of breath.    Cardiovascular: Negative for chest pain, palpitations and peripheral edema.   Gastrointestinal: Negative for abdominal pain, constipation, diarrhea, heartburn, hematochezia and nausea.   Genitourinary: Negative for difficulty urinating, dysuria, genital sores, hematuria, pelvic pain, urgency, vaginal bleeding and vaginal discharge.   Musculoskeletal: Negative for arthralgias, joint swelling and myalgias.   Skin: Negative for rash.   Neurological: Negative for dizziness, weakness, headaches and paresthesias.   Psychiatric/Behavioral: Negative for mood changes. The patient is not nervous/anxious.           OBJECTIVE:   /82   Pulse 100   Temp 96.2  F (35.7  C) (Temporal)   Resp 14   Ht 1.535 m (5' 0.43\")   Wt 62.1 kg (137 lb)   LMP 06/02/2007   SpO2 100%   BMI 26.37 kg/m    Physical Exam  Constitutional:       General: She is not in acute distress.     Appearance: She is well-developed.   HENT:      Right Ear: Tympanic membrane and external ear normal.      Left Ear: Tympanic membrane and external ear normal.      Nose: Nose normal.      Mouth/Throat:      Pharynx: No oropharyngeal exudate.   Eyes:      " General:         Right eye: No discharge.         Left eye: No discharge.      Conjunctiva/sclera: Conjunctivae normal.      Pupils: Pupils are equal, round, and reactive to light.   Neck:      Musculoskeletal: Neck supple.      Thyroid: No thyromegaly.      Trachea: No tracheal deviation.   Cardiovascular:      Rate and Rhythm: Normal rate and regular rhythm.      Pulses: Normal pulses.      Heart sounds: Normal heart sounds, S1 normal and S2 normal. No murmur. No friction rub. No S3 or S4 sounds.    Pulmonary:      Effort: Pulmonary effort is normal. No respiratory distress.      Breath sounds: Normal breath sounds. No wheezing or rales.   Abdominal:      General: Bowel sounds are normal.      Palpations: Abdomen is soft. There is no mass.      Tenderness: There is no abdominal tenderness.   Musculoskeletal: Normal range of motion.   Lymphadenopathy:      Cervical: No cervical adenopathy.   Skin:     General: Skin is warm and dry.      Findings: No rash.   Neurological:      Mental Status: She is alert and oriented to person, place, and time.      Motor: No abnormal muscle tone.      Deep Tendon Reflexes: Reflexes are normal and symmetric.   Psychiatric:         Thought Content: Thought content normal.         Judgment: Judgment normal.           ASSESSMENT/PLAN:   1. Routine general medical examination at a health care facility    2. Screen for colon cancer    - GASTROENTEROLOGY ADULT REF PROCEDURE ONLY; Future    3. Menopausal syndrome (hot flashes)  Patient had breast cancer diagnosed in 2008.  She has been on Effexor for hot flashes.  She still has mild hot flashes but they are not bothersome.  We will continue Effexor at this time without change.    - venlafaxine (EFFEXOR-XR) 75 MG 24 hr capsule; TAKE ONE CAPSULE BY MOUTH ONCE DAILY  Dispense: 90 capsule; Refill: 3    Patient has been advised of split billing requirements and indicates understanding: No  COUNSELING:  Reviewed preventive health counseling, as  "reflected in patient instructions    Estimated body mass index is 26.37 kg/m  as calculated from the following:    Height as of this encounter: 1.535 m (5' 0.43\").    Weight as of this encounter: 62.1 kg (137 lb).    Weight management plan: Discussed healthy diet and exercise guidelines    She reports that she has never smoked. She has never used smokeless tobacco.      Counseling Resources:  ATP IV Guidelines  Pooled Cohorts Equation Calculator  Breast Cancer Risk Calculator  BRCA-Related Cancer Risk Assessment: FHS-7 Tool  FRAX Risk Assessment  ICSI Preventive Guidelines  Dietary Guidelines for Americans, 2010  USDA's MyPlate  ASA Prophylaxis  Lung CA Screening    Sowmya Gao MD  [facility]  "

## 2020-12-11 ENCOUNTER — OFFICE VISIT (OUTPATIENT)
Dept: FAMILY MEDICINE | Facility: OTHER | Age: 60
End: 2020-12-11
Payer: COMMERCIAL

## 2020-12-11 VITALS
TEMPERATURE: 96.2 F | SYSTOLIC BLOOD PRESSURE: 124 MMHG | DIASTOLIC BLOOD PRESSURE: 82 MMHG | OXYGEN SATURATION: 100 % | WEIGHT: 137 LBS | BODY MASS INDEX: 26.9 KG/M2 | HEART RATE: 100 BPM | RESPIRATION RATE: 14 BRPM | HEIGHT: 60 IN

## 2020-12-11 DIAGNOSIS — N95.1 MENOPAUSAL SYNDROME (HOT FLASHES): ICD-10-CM

## 2020-12-11 DIAGNOSIS — Z12.11 SCREEN FOR COLON CANCER: ICD-10-CM

## 2020-12-11 DIAGNOSIS — Z00.00 ROUTINE GENERAL MEDICAL EXAMINATION AT A HEALTH CARE FACILITY: Primary | ICD-10-CM

## 2020-12-11 PROCEDURE — 99396 PREV VISIT EST AGE 40-64: CPT | Performed by: FAMILY MEDICINE

## 2020-12-11 RX ORDER — VENLAFAXINE HYDROCHLORIDE 75 MG/1
CAPSULE, EXTENDED RELEASE ORAL
Qty: 90 CAPSULE | Refills: 3 | Status: SHIPPED | OUTPATIENT
Start: 2020-12-11 | End: 2021-12-16

## 2020-12-11 ASSESSMENT — ENCOUNTER SYMPTOMS
PARESTHESIAS: 0
SORE THROAT: 0
HEMATURIA: 0
PALPITATIONS: 0
DIFFICULTY URINATING: 0
HEARTBURN: 0
ARTHRALGIAS: 0
WEAKNESS: 0
NERVOUS/ANXIOUS: 0
HEADACHES: 0
SHORTNESS OF BREATH: 0
MYALGIAS: 0
ABDOMINAL PAIN: 0
HEMATOCHEZIA: 0
CONSTIPATION: 0
DIZZINESS: 0
NAUSEA: 0
JOINT SWELLING: 0
COUGH: 0
CHILLS: 0
DIARRHEA: 0
DYSURIA: 0
EYE PAIN: 0
FEVER: 0

## 2020-12-11 ASSESSMENT — MIFFLIN-ST. JEOR: SCORE: 1119.8

## 2020-12-11 ASSESSMENT — PAIN SCALES - GENERAL: PAINLEVEL: MILD PAIN (3)

## 2021-01-10 ENCOUNTER — HEALTH MAINTENANCE LETTER (OUTPATIENT)
Age: 61
End: 2021-01-10

## 2021-04-08 ENCOUNTER — TELEPHONE (OUTPATIENT)
Dept: FAMILY MEDICINE | Facility: OTHER | Age: 61
End: 2021-04-08

## 2021-04-08 NOTE — TELEPHONE ENCOUNTER
Patient Quality Outreach Summary      Summary:    Patient is due/failing the following:   Colonoscopy    Type of outreach:    Phone, spoke to patient/parent. patient declines at this time. will be ready in June.    Questions for provider review:    None                                                                                                                    Sandy Ibarra CMA     Chart routed to Care Team.

## 2021-10-23 ENCOUNTER — HEALTH MAINTENANCE LETTER (OUTPATIENT)
Age: 61
End: 2021-10-23

## 2021-12-16 DIAGNOSIS — N95.1 MENOPAUSAL SYNDROME (HOT FLASHES): ICD-10-CM

## 2021-12-16 RX ORDER — VENLAFAXINE HYDROCHLORIDE 75 MG/1
CAPSULE, EXTENDED RELEASE ORAL
Qty: 30 CAPSULE | Refills: 0 | Status: SHIPPED | OUTPATIENT
Start: 2021-12-16 | End: 2022-01-05

## 2022-01-05 ENCOUNTER — MYC MEDICAL ADVICE (OUTPATIENT)
Dept: FAMILY MEDICINE | Facility: OTHER | Age: 62
End: 2022-01-05
Payer: COMMERCIAL

## 2022-01-05 DIAGNOSIS — N95.1 MENOPAUSAL SYNDROME (HOT FLASHES): ICD-10-CM

## 2022-01-05 RX ORDER — VENLAFAXINE HYDROCHLORIDE 75 MG/1
CAPSULE, EXTENDED RELEASE ORAL
Qty: 90 CAPSULE | Refills: 0 | Status: SHIPPED | OUTPATIENT
Start: 2022-01-05 | End: 2022-02-04

## 2022-01-05 NOTE — TELEPHONE ENCOUNTER
Venlafaxine refill request.  Has pending appointment Feb 4.  Routing refill request to provider for review/approval because:  Drug interaction warning with tramadol  Med pended.  To MD to advise.  leo

## 2022-02-04 ENCOUNTER — OFFICE VISIT (OUTPATIENT)
Dept: FAMILY MEDICINE | Facility: OTHER | Age: 62
End: 2022-02-04
Payer: COMMERCIAL

## 2022-02-04 VITALS
RESPIRATION RATE: 16 BRPM | TEMPERATURE: 98.6 F | WEIGHT: 140 LBS | BODY MASS INDEX: 26.43 KG/M2 | HEART RATE: 85 BPM | HEIGHT: 61 IN | OXYGEN SATURATION: 99 % | SYSTOLIC BLOOD PRESSURE: 114 MMHG | DIASTOLIC BLOOD PRESSURE: 80 MMHG

## 2022-02-04 DIAGNOSIS — Z12.11 SPECIAL SCREENING FOR MALIGNANT NEOPLASMS, COLON: ICD-10-CM

## 2022-02-04 DIAGNOSIS — N95.1 MENOPAUSAL SYNDROME (HOT FLASHES): ICD-10-CM

## 2022-02-04 DIAGNOSIS — Z00.00 ROUTINE GENERAL MEDICAL EXAMINATION AT A HEALTH CARE FACILITY: Primary | ICD-10-CM

## 2022-02-04 PROCEDURE — 99396 PREV VISIT EST AGE 40-64: CPT | Performed by: FAMILY MEDICINE

## 2022-02-04 RX ORDER — VENLAFAXINE HYDROCHLORIDE 37.5 MG/1
37.5 CAPSULE, EXTENDED RELEASE ORAL DAILY
Qty: 90 CAPSULE | Refills: 3 | Status: SHIPPED | OUTPATIENT
Start: 2022-02-04 | End: 2023-04-07

## 2022-02-04 ASSESSMENT — ENCOUNTER SYMPTOMS
ARTHRALGIAS: 1
SORE THROAT: 0
CHILLS: 0
EYE PAIN: 0
ABDOMINAL PAIN: 0
PARESTHESIAS: 0
FEVER: 0
HEADACHES: 0
FREQUENCY: 0
HEARTBURN: 0
MYALGIAS: 1
NAUSEA: 0
HEMATURIA: 0
DIZZINESS: 0
NERVOUS/ANXIOUS: 0
PALPITATIONS: 0
SHORTNESS OF BREATH: 0
JOINT SWELLING: 0
WEAKNESS: 0
HEMATOCHEZIA: 0
DYSURIA: 0
DIARRHEA: 0
CONSTIPATION: 0
COUGH: 0
BREAST MASS: 0

## 2022-02-04 ASSESSMENT — MIFFLIN-ST. JEOR: SCORE: 1131.54

## 2022-02-04 ASSESSMENT — PAIN SCALES - GENERAL: PAINLEVEL: MILD PAIN (2)

## 2022-02-04 NOTE — PROGRESS NOTES
SUBJECTIVE:   CC: Leia Kingston is an 61 year old woman who presents for preventive health visit.       Patient has been advised of split billing requirements and indicates understanding: Yes  Healthy Habits:    Getting at least 3 servings of Calcium per day:  Yes    Bi-annual eye exam:  Yes    Dental care twice a year:  Yes    Sleep apnea or symptoms of sleep apnea:  None    Diet:  Regular (no restrictions)    Frequency of exercise:  1 day/week    Duration of exercise:  Less than 15 minutes    Taking medications regularly:  Yes    Medication side effects:  None    PHQ-2 Total Score:    Additional concerns today:  Yes      Today's PHQ-2 Score:   PHQ-2 ( 1999 Pf[employer]) 2/4/2022   Q1: Little interest or pleasure in doing things 0   Q2: Feeling down, depressed or hopeless 0   PHQ-2 Score 0   PHQ-2 Total Score (12-17 Years)- Positive if 3 or more points; Administer PHQ-A if positive -   Q1: Little interest or pleasure in doing things Not at all   Q2: Feeling down, depressed or hopeless Not at all   PHQ-2 Score 0       Abuse: Current or Past (Physical, Sexual or Emotional) - No  Do you feel safe in your environment? Yes        Social History     Tobacco Use     Smoking status: Never Smoker     Smokeless tobacco: Never Used     Tobacco comment: no smokers in household   Substance Use Topics     Alcohol use: Yes     Comment: beer or wine x3/,1 x per month     If you drink alcohol do you typically have >3 drinks per day or >7 drinks per week? No    Alcohol Use 2/4/2022   Prescreen: >3 drinks/day or >7 drinks/week? No   Prescreen: >3 drinks/day or >7 drinks/week? -       Reviewed orders with patient.  Reviewed health maintenance and updated orders accordingly - Yes      Breast Cancer Screening:  Any new diagnosis of family breast, ovarian, or bowel cancer? No    FHS-7:   Breast CA Risk Assessment (FHS-7) 2/4/2022   Did any of your first-degree relatives have breast or ovarian cancer? No   Did any of your relatives  have bilateral breast cancer? No   Did any man in your family have breast cancer? No   Did any woman in your family have breast and ovarian cancer? No   Did any woman in your family have breast cancer before age 50 y? No   Do you have 2 or more relatives with breast and/or ovarian cancer? No   Do you have 2 or more relatives with breast and/or bowel cancer? No       Mammogram Screening: Recommended mammography every 1-2 years with patient discussion and risk factor consideration  Pertinent mammograms are reviewed under the imaging tab.    History of abnormal Pap smear: NO - age 30-65 PAP every 5 years with negative HPV co-testing recommended  PAP / HPV Latest Ref Rng & Units 12/3/2019 11/18/2015 7/9/2010   PAP (Historical) - NIL NIL NIL   HPV16 NEG:Negative Negative Negative -   HPV18 NEG:Negative Negative Negative -   HRHPV NEG:Negative Negative Negative -     Reviewed and updated as needed this visit by clinical staff  Tobacco  Allergies  Meds  Problems  Med Hx  Surg Hx  Fam Hx  Soc Hx         Reviewed and updated as needed this visit by Provider  Tobacco  Allergies  Meds  Problems  Med Hx  Surg Hx  Fam Hx          Review of Systems   Constitutional: Negative for chills and fever.   HENT: Negative for congestion, ear pain, hearing loss and sore throat.    Eyes: Negative for pain and visual disturbance.   Respiratory: Negative for cough and shortness of breath.    Cardiovascular: Negative for chest pain, palpitations and peripheral edema.   Gastrointestinal: Negative for abdominal pain, constipation, diarrhea, heartburn, hematochezia and nausea.   Breasts:  Positive for tenderness. Negative for breast mass and discharge.   Genitourinary: Negative for dysuria, frequency, genital sores, hematuria, pelvic pain, urgency, vaginal bleeding and vaginal discharge.   Musculoskeletal: Positive for arthralgias and myalgias. Negative for joint swelling.   Skin: Negative for rash.   Neurological: Negative for  "dizziness, weakness, headaches and paresthesias.   Psychiatric/Behavioral: Negative for mood changes. The patient is not nervous/anxious.           OBJECTIVE:   /80   Pulse 85   Temp 98.6  F (37  C) (Temporal)   Resp 16   Ht 1.54 m (5' 0.63\")   Wt 63.5 kg (140 lb)   LMP 06/02/2007   SpO2 99%   BMI 26.78 kg/m    Physical Exam  Constitutional:       General: She is not in acute distress.     Appearance: She is well-developed.   HENT:      Right Ear: Tympanic membrane and external ear normal.      Left Ear: Tympanic membrane and external ear normal.      Nose: Nose normal.      Mouth/Throat:      Pharynx: No oropharyngeal exudate.   Eyes:      General:         Right eye: No discharge.         Left eye: No discharge.      Conjunctiva/sclera: Conjunctivae normal.      Pupils: Pupils are equal, round, and reactive to light.   Neck:      Thyroid: No thyromegaly.      Trachea: No tracheal deviation.   Cardiovascular:      Rate and Rhythm: Normal rate and regular rhythm.      Pulses: Normal pulses.      Heart sounds: Normal heart sounds, S1 normal and S2 normal. No murmur heard.  No friction rub. No S3 or S4 sounds.    Pulmonary:      Effort: Pulmonary effort is normal. No respiratory distress.      Breath sounds: Normal breath sounds. No wheezing or rales.   Abdominal:      General: Bowel sounds are normal.      Palpations: Abdomen is soft. There is no mass.      Tenderness: There is no abdominal tenderness.   Musculoskeletal:         General: Normal range of motion.      Cervical back: Neck supple.   Lymphadenopathy:      Cervical: No cervical adenopathy.   Skin:     General: Skin is warm and dry.      Findings: No rash.   Neurological:      Mental Status: She is alert and oriented to person, place, and time.      Motor: No abnormal muscle tone.      Deep Tendon Reflexes: Reflexes are normal and symmetric.   Psychiatric:         Thought Content: Thought content normal.         Judgment: Judgment normal. " "        ASSESSMENT/PLAN:   (Z00.00) Routine general medical examination at a health care facility  (primary encounter diagnosis)  Comment: Follows with rheumatology for her fibromyalgia.  They have talked about perhaps using Plaquenil but are currently continuing to monitor her symptoms.  She has noticed that she has been more achy after having Covid last spring.    (N95.1) Menopausal syndrome (hot flashes)  Comment: She feels her hot flashes are mainly controlled but intermittently she will have a bad day.  She is wondering about natural treatments.  We discussed not stopping the Effexor without tapering.  Will decrease the dose from 75 mg to 37.5 mg.  Discussed that if her hot flashes worsen could always increase the dose.  We also discussed that venlafaxine could be improving her fibromyalgia pain and if this flares as we decrease the dose she may want to consider increasing the dose back.  Plan: venlafaxine (EFFEXOR-XR) 37.5 MG 24 hr capsule            (Z12.11) Special screening for malignant neoplasms, colon  Plan: Adult Gastro Ref - Procedure Only            Patient has been advised of split billing requirements and indicates understanding: Yes    COUNSELING:  Reviewed preventive health counseling, as reflected in patient instructions    Estimated body mass index is 26.78 kg/m  as calculated from the following:    Height as of this encounter: 1.54 m (5' 0.63\").    Weight as of this encounter: 63.5 kg (140 lb).        She reports that she has never smoked. She has never used smokeless tobacco.      Counseling Resources:  ATP IV Guidelines  Pooled Cohorts Equation Calculator  Breast Cancer Risk Calculator  BRCA-Related Cancer Risk Assessment: FHS-7 Tool  FRAX Risk Assessment  ICSI Preventive Guidelines  Dietary Guidelines for Americans, 2010  USDA's MyPlate  ASA Prophylaxis  Lung CA Screening    Sowmya Gao MD  [facility]  "

## 2022-02-15 ENCOUNTER — TELEPHONE (OUTPATIENT)
Dept: GASTROENTEROLOGY | Facility: CLINIC | Age: 62
End: 2022-02-15
Payer: COMMERCIAL

## 2022-02-15 NOTE — TELEPHONE ENCOUNTER
Screening Questions  Blue=prep questions Red=location Green=sedation   1. Are you active on mychart? y    2. What insurance is in the chart? [employer]     3.  Ordering/Referring Provider: Sowmya Gao    4. BMI 25.7, If greater than 40 review exclusion criteria also will need EXTENDED PREP    5.  Respiratory Screening (If yes to any of the following HOSPITAL setting only):     Do you use daily home oxygen? n  Do you have mod to severe Obstructive Sleep Apnea? n (can be seen at [facility] or hospital setting)    Do you have Pulmonary Hypertension? n   Do you have UNCONTROLLED asthma? n    6. Have you had a heart or lung transplant? n  (If yes, please review exclusion criteria)    7. Are you currently on dialysis?n  (If yes, schedule in HOSPITAL setting only)(If yes, please send Golytely prep)    8. Do you have chronic kidney disease? n (If yes, please send Golytely prep)    9. Have you had a stroke or Transient ischemic attack (TIA) within 6 months? n (If yes, do not schedule at [facility])    10. In the past 6 months, have you had any heart related issues including cardiomyopathy or heart attack? n (If yes, please review exclusion criteria)           If yes, did it require cardiac stenting or other implantable device?  (If yes, please review exclusion criteria)      11. Do you have any implantable devices in your body (pacemaker, defib, LVAD)? n (If yes, schedule at [facility])    12. Do you take nitroglycerin? If yes, how often? n (if yes, schedule at HOSPITAL setting)    13. Are you currently taking any blood thinners?n (If yes- inform patient to follow up with PCP or provider for follow up instructions)     14. Are you a diabetic? n (If yes, please send Golytely prep)    15. (Females) Are you currently pregnant?   If yes, how many weeks?      16. Are you taking any prescription pain medications on a routine schedule? Tremadol  If yes, MAC sedation and patient will need EXTENDED PREP.    17. Do you have any chemical  dependencies such as alcohol, street drugs, or methadone? n If yes, MAC sedation     18. Do you have any history of post-traumatic stress syndrome, severe anxiety or history of psychosis? n  If yes, MAC sedation.     19. Do you transfer independently? y    20.  Do you have any issues with constipation? n   If yes, pt will need EXTENDED PREP     21. Preferred Pharmacy for Pre Prescription [id number] - [place], [place] - [address] [place] [address]    Scheduling Details    Which Colonoscopy Prep was Sent?:   Type of Procedure Scheduled:   Surgeon:   Date of Procedure:   Location:   Caller (Please ask for phone number if not scheduled by patient):       Sedation Type:   Conscious Sedation- Needs  for 6 hours after the procedure  MAC/General-Needs  for 24 hours after procedure    Pre-op Required at [facility], [place], [place] and OR for MAC sedation:   (if yes advise patient they will need a pre-op prior to procedure)      Informed patient they will need an adult    Cannot take any type of public or medical transportation alone    Pre-Procedure Covid test to be completed at [facility] or Externally:     Confirmed Nurse will call to complete assessment     Additional comments: Pt does  and would like us to call her back mid next week so she can talk to her sister who is currently out of the country on when she will be available to drive her for the Colonoscopy. ([facility] PATIENTS NEED EXTENDED PREP)

## 2022-02-25 ENCOUNTER — HOSPITAL ENCOUNTER (OUTPATIENT)
Facility: AMBULATORY SURGERY CENTER | Age: 62
End: 2022-02-25
Attending: SURGERY | Admitting: SURGERY
Payer: COMMERCIAL

## 2022-02-25 ENCOUNTER — TELEPHONE (OUTPATIENT)
Dept: GASTROENTEROLOGY | Facility: CLINIC | Age: 62
End: 2022-02-25
Payer: COMMERCIAL

## 2022-02-25 DIAGNOSIS — Z11.59 ENCOUNTER FOR SCREENING FOR OTHER VIRAL DISEASES: Primary | ICD-10-CM

## 2022-02-25 NOTE — TELEPHONE ENCOUNTER
Screening Questions  Blue=prep questions Red=location Green=sedation   1. Are you active on mychart? y     2. What insurance is in the chart? [employer]      3.  Ordering/Referring Provider: Sowmya Gao     4. BMI 25.7, If greater than 40 review exclusion criteria also will need EXTENDED PREP     5.  Respiratory Screening (If yes to any of the following HOSPITAL setting only):                Do you use daily home oxygen? n  Do you have mod to severe Obstructive Sleep Apnea? n (can be seen at [facility] or hospital setting)               Do you have Pulmonary Hypertension? n              Do you have UNCONTROLLED asthma? n     6. Have you had a heart or lung transplant? n  (If yes, please review exclusion criteria)     7. Are you currently on dialysis?n  (If yes, schedule in HOSPITAL setting only)(If yes, please send Golytely prep)     8. Do you have chronic kidney disease? n (If yes, please send Golytely prep)     9. Have you had a stroke or Transient ischemic attack (TIA) within 6 months? n (If yes, do not schedule at [facility])     10. In the past 6 months, have you had any heart related issues including cardiomyopathy or heart attack? n (If yes, please review exclusion criteria)             If yes, did it require cardiac stenting or other implantable device?  (If yes, please review exclusion criteria)        11. Do you have any implantable devices in your body (pacemaker, defib, LVAD)? n (If yes, schedule at [facility])     12. Do you take nitroglycerin? If yes, how often? n (if yes, schedule at HOSPITAL setting)     13. Are you currently taking any blood thinners?n (If yes- inform patient to follow up with PCP or provider for follow up instructions)      14. Are you a diabetic? n (If yes, please send Golytely prep)     15. (Females) Are you currently pregnant?   If yes, how many weeks?        16. Are you taking any prescription pain medications on a routine schedule? Tremadol  If yes, MAC sedation and patient will need  EXTENDED PREP.     17. Do you have any chemical dependencies such as alcohol, street drugs, or methadone? n If yes, MAC sedation      18. Do you have any history of post-traumatic stress syndrome, severe anxiety or history of psychosis? n  If yes, MAC sedation.      19. Do you transfer independently? y     20.  Do you have any issues with constipation? n   If yes, pt will need EXTENDED PREP      21. Preferred Pharmacy for Pre Prescription [id number] - [place], [place] - [address] [place] [address]     Scheduling Details     Which Colonoscopy Prep was Sent?: MPREP  Type of Procedure Scheduled: COLON  Surgeon: LUCERO  Date of Procedure: 3/30  Location:   Caller (Please ask for phone number if not scheduled by patient):         Sedation Type: MAC  Conscious Sedation- Needs  for 6 hours after the procedure  MAC/General-Needs  for 24 hours after procedure     Pre-op Required at [facility], [place], [place] and OR for MAC sedation:Y   (if yes advise patient they will need a pre-op prior to procedure)      Informed patient they will need an adult  Y  Cannot take any type of public or medical transportation alone     Pre-Procedure Covid test to be completed at [facility] or Externally: 3/26     Confirmed Nurse will call to complete assessment Y     Additional comments:  ([facility] PATIENTS NEED EXTENDED PREP)

## 2022-03-28 RX ORDER — SODIUM CHLORIDE, SODIUM LACTATE, POTASSIUM CHLORIDE, CALCIUM CHLORIDE 600; 310; 30; 20 MG/100ML; MG/100ML; MG/100ML; MG/100ML
INJECTION, SOLUTION INTRAVENOUS CONTINUOUS
Status: CANCELLED | OUTPATIENT
Start: 2022-03-28

## 2022-03-28 RX ORDER — ONDANSETRON 4 MG/1
4 TABLET, ORALLY DISINTEGRATING ORAL EVERY 30 MIN PRN
Status: CANCELLED | OUTPATIENT
Start: 2022-03-28

## 2022-03-28 RX ORDER — ONDANSETRON 2 MG/ML
4 INJECTION INTRAMUSCULAR; INTRAVENOUS EVERY 30 MIN PRN
Status: CANCELLED | OUTPATIENT
Start: 2022-03-28

## 2022-03-28 RX ORDER — LIDOCAINE 40 MG/G
CREAM TOPICAL
Status: CANCELLED | OUTPATIENT
Start: 2022-03-28

## 2022-03-29 ENCOUNTER — TELEPHONE (OUTPATIENT)
Dept: GASTROENTEROLOGY | Facility: CLINIC | Age: 62
End: 2022-03-29
Payer: COMMERCIAL

## 2022-03-29 DIAGNOSIS — Z11.59 ENCOUNTER FOR SCREENING FOR OTHER VIRAL DISEASES: Primary | ICD-10-CM

## 2022-03-29 NOTE — TELEPHONE ENCOUNTER
"Caller: José Miguel Dupree    Procedure: Colon    Date, Location, and Surgeon of Procedure Cancelled: 3/30, Sage RODRIGUEZ    Ordering Provider:Sowmya Gao MD     Reason for cancel (please be detailed, any staff messages or encounters to note?): See Staff Message:    From:Amy Baker RN To:  P Endoscopy Scheduling Pool  Date/Time: 3/28 8:02 AM  Subject: Reschedule Colon MAC    \"Could you please call patient to reschedule? She needs MAC sedation. Please remind that she needs to schedule H&P.     Thank you,     Amy\"     Rescheduled: Y     If rescheduled:    Date: 4/29   Location: [place]   Note any change or update to original order/sedation: Needs MAC sedation, no longer needs H&P due to being scheduled at [place].                "

## 2022-04-12 DIAGNOSIS — N95.1 MENOPAUSAL SYNDROME (HOT FLASHES): ICD-10-CM

## 2022-04-12 NOTE — TELEPHONE ENCOUNTER
"Pending Prescriptions:                       Disp   Refills    venlafaxine (EFFEXOR-XR) 75 MG 24 hr capsu*90 cap*0        Sig: TAKE ONE CAPSULE BY MOUTH ONCE DAILY . (APPOINTMENT           PENDING FOR FEB)    Routing refill request to provider for review/approval because:  Labs not current:      Requested Prescriptions   Pending Prescriptions Disp Refills    venlafaxine (EFFEXOR-XR) 75 MG 24 hr capsule [Pharmacy Med Name: VENLAFAXINE HCL ER 75MG CP24] 90 capsule 0     Sig: TAKE ONE CAPSULE BY MOUTH ONCE DAILY . (APPOINTMENT PENDING FOR FEB)        Serotonin-Norepinephrine Reuptake Inhibitors  Failed - 4/12/2022  5:01 AM        Failed - Normal serum creatinine on file in past 12 months     Recent Labs   Lab Test 11/18/15  1107   CR 0.56       Ok to refill medication if creatinine is low          Passed - Blood pressure under 140/90 in past 12 months       BP Readings from Last 3 Encounters:   02/04/22 114/80   12/11/20 124/82   12/03/19 118/76                 Passed - Recent (12 mo) or future (30 days) visit within the authorizing provider's specialty     Patient has had an office visit with the authorizing provider or a provider within the authorizing providers department within the previous 12 mos or has a future within next 30 days. See \"Patient Info\" tab in inbasket, or \"Choose Columns\" in Meds & Orders section of the refill encounter.              Passed - Medication is active on med list        Passed - Patient is age 18 or older        Passed - No active pregnancy on record        Passed - No positive pregnancy test in past 12 months                    "

## 2022-04-14 RX ORDER — VENLAFAXINE HYDROCHLORIDE 37.5 MG/1
37.5 CAPSULE, EXTENDED RELEASE ORAL DAILY
Qty: 90 CAPSULE | Refills: 3 | Status: SHIPPED | OUTPATIENT
Start: 2022-04-14 | End: 2023-04-07

## 2022-04-26 ENCOUNTER — LAB (OUTPATIENT)
Dept: LAB | Facility: OTHER | Age: 62
End: 2022-04-26
Payer: COMMERCIAL

## 2022-04-26 DIAGNOSIS — Z11.59 ENCOUNTER FOR SCREENING FOR OTHER VIRAL DISEASES: ICD-10-CM

## 2022-04-26 PROCEDURE — U0003 INFECTIOUS AGENT DETECTION BY NUCLEIC ACID (DNA OR RNA); SEVERE ACUTE RESPIRATORY SYNDROME CORONAVIRUS 2 (SARS-COV-2) (CORONAVIRUS DISEASE [COVID-19]), AMPLIFIED PROBE TECHNIQUE, MAKING USE OF HIGH THROUGHPUT TECHNOLOGIES AS DESCRIBED BY CMS-2020-01-R: HCPCS

## 2022-04-26 PROCEDURE — U0005 INFEC AGEN DETEC AMPLI PROBE: HCPCS

## 2022-04-27 LAB — SARS-COV-2 RNA RESP QL NAA+PROBE: NEGATIVE

## 2022-04-28 NOTE — H&P
[facility] History and Physical    Leia Kingston MRN# [id number]   Age: 62 year old YOB: 1960     Date of Admission:  (Not on file)    Home clinic: [facility]  Primary care provider: Sowmya Gao          Impression and Plan:   Impression:   Special screening for malignant neoplasms, colon [Z12.11]  Last colonoscopy - ;      Plan:   Proceed to Colonoscopy as planned.  The procedure, risks(bleeding, perforation), benefits and alternatives were discussed and the patient agrees to proceed. Cleared for Anesthesia             Chief Complaint:   Special screening for malignant neoplasms, colon [Z12.11]    History is obtained from the patient          History of Present Illness:   This 62 year old female is being seen at this time for evaluation for colonoscopy.  No family hx or complaints.           Past Medical History:     Past Medical History:   Diagnosis Date     Malignant neoplasm of breast (female), unspecified site             Past Surgical History:     Past Surgical History:   Procedure Laterality Date     BX/REMV,LYMPH NODE,DEEP AXILL  2007     BX/REMV,LYMPH NODE,DEEP AXILL  2007    Left axillary dissection - Breast cancer.     COLONOSCOPY  08/02/10     HC DILATION/CURETTAGE DIAG/THER NON OB      D & C x 2     HC EXCISION BREAST LESION, OPEN >=1  07/10/2007     HC TOOTH EXTRACTION W/FORCEP      wisdom teeth            Social History:     Social History     Tobacco Use     Smoking status: Never Smoker     Smokeless tobacco: Never Used     Tobacco comment: no smokers in household   Substance Use Topics     Alcohol use: Yes     Comment: beer or wine x3/,1 x per month            Family History:     Family History   Problem Relation Age of Onset     C.A.D. Father          MI age 47     Osteoporosis Mother      Hypertension Mother      Osteoporosis Sister             Immunizations:     VACCINE/DOSE   Diptheria   DPT   DTAP   HBIG    Hepatitis A   Hepatitis B   HIB   Influenza   Measles   Meningococcal   MMR   Mumps   Pneumococcal   Polio   Rubella   Small Pox   TDAP   Varicella   Zoster            Allergies:     Allergies   Allergen Reactions     No Known Drug Allergies             Medications:     No current facility-administered medications for this encounter.     Current Outpatient Medications   Medication Sig     aspirin 81 MG chewable tablet Take 1 tablet (81 mg) by mouth daily     Multiple Vitamins-Minerals (THRIVE FOR LIFE WOMENS PO)      tiZANidine (ZANAFLEX) 4 MG tablet      traMADol (ULTRAM) 50 MG tablet      venlafaxine (EFFEXOR-XR) 37.5 MG 24 hr capsule Take 1 capsule (37.5 mg) by mouth daily     venlafaxine (EFFEXOR-XR) 37.5 MG 24 hr capsule Take 1 capsule (37.5 mg) by mouth daily     VITAMIN D, CHOLECALCIFEROL, PO Take 2,000 Units by mouth daily             Review of Systems:   The review of systems was positive for the following findings.  None.  The remainder of the review of systems was unremarkable.          Physical Exam:   All vitals have been reviewed  Last menstrual period 06/02/2007, not currently breastfeeding.  No intake or output data in the 24 hours ending 04/28/22 1033  SHEENT examination revealed NC/AT EOMI.  Examination of the chest revealed CTA.  Examination of the heart revealed RRR.  Examination of the abdomen revealed soft, non tender.  The neuromuscular examination was NL.          Data:   All laboratory data reviewed  No results found for any visits on 04/29/22.  -     Thiago Osorio MD, FACS

## 2022-04-29 ENCOUNTER — ANESTHESIA (OUTPATIENT)
Dept: GASTROENTEROLOGY | Facility: CLINIC | Age: 62
End: 2022-04-29
Payer: COMMERCIAL

## 2022-04-29 ENCOUNTER — HOSPITAL ENCOUNTER (OUTPATIENT)
Facility: CLINIC | Age: 62
Discharge: HOME | End: 2022-04-29
Attending: SPECIALIST | Admitting: SPECIALIST
Payer: COMMERCIAL

## 2022-04-29 ENCOUNTER — ANESTHESIA EVENT (OUTPATIENT)
Dept: GASTROENTEROLOGY | Facility: CLINIC | Age: 62
End: 2022-04-29
Payer: COMMERCIAL

## 2022-04-29 VITALS
TEMPERATURE: 97.9 F | HEART RATE: 75 BPM | SYSTOLIC BLOOD PRESSURE: 122 MMHG | RESPIRATION RATE: 16 BRPM | DIASTOLIC BLOOD PRESSURE: 78 MMHG | OXYGEN SATURATION: 100 %

## 2022-04-29 LAB — COLONOSCOPY: NORMAL

## 2022-04-29 PROCEDURE — G0121 COLON CA SCRN NOT HI RSK IND: HCPCS | Performed by: SPECIALIST

## 2022-04-29 PROCEDURE — 45378 DIAGNOSTIC COLONOSCOPY: CPT | Performed by: SPECIALIST

## 2022-04-29 PROCEDURE — 250N000009 HC RX 250: Performed by: SPECIALIST

## 2022-04-29 PROCEDURE — 258N000003 HC RX IP 258 OP 636: Performed by: SPECIALIST

## 2022-04-29 PROCEDURE — 370N000017 HC ANESTHESIA TECHNICAL FEE, PER MIN: Performed by: SPECIALIST

## 2022-04-29 PROCEDURE — 250N000011 HC RX IP 250 OP 636: Performed by: NURSE ANESTHETIST, CERTIFIED REGISTERED

## 2022-04-29 PROCEDURE — 250N000009 HC RX 250: Performed by: NURSE ANESTHETIST, CERTIFIED REGISTERED

## 2022-04-29 RX ORDER — LIDOCAINE HYDROCHLORIDE 20 MG/ML
INJECTION, SOLUTION INFILTRATION; PERINEURAL PRN
Status: DISCONTINUED | OUTPATIENT
Start: 2022-04-29 | End: 2022-04-29

## 2022-04-29 RX ORDER — PROPOFOL 10 MG/ML
INJECTION, EMULSION INTRAVENOUS PRN
Status: DISCONTINUED | OUTPATIENT
Start: 2022-04-29 | End: 2022-04-29

## 2022-04-29 RX ORDER — LIDOCAINE 40 MG/G
CREAM TOPICAL
Status: DISCONTINUED | OUTPATIENT
Start: 2022-04-29 | End: 2022-04-29 | Stop reason: HOSPADM

## 2022-04-29 RX ORDER — PROPOFOL 10 MG/ML
INJECTION, EMULSION INTRAVENOUS CONTINUOUS PRN
Status: DISCONTINUED | OUTPATIENT
Start: 2022-04-29 | End: 2022-04-29

## 2022-04-29 RX ORDER — SODIUM CHLORIDE, SODIUM LACTATE, POTASSIUM CHLORIDE, CALCIUM CHLORIDE 600; 310; 30; 20 MG/100ML; MG/100ML; MG/100ML; MG/100ML
INJECTION, SOLUTION INTRAVENOUS CONTINUOUS
Status: DISCONTINUED | OUTPATIENT
Start: 2022-04-29 | End: 2022-04-29 | Stop reason: HOSPADM

## 2022-04-29 RX ADMIN — SODIUM CHLORIDE, POTASSIUM CHLORIDE, SODIUM LACTATE AND CALCIUM CHLORIDE: 600; 310; 30; 20 INJECTION, SOLUTION INTRAVENOUS at 12:37

## 2022-04-29 RX ADMIN — LIDOCAINE HYDROCHLORIDE 60 MG: 20 INJECTION, SOLUTION INFILTRATION; PERINEURAL at 13:21

## 2022-04-29 RX ADMIN — LIDOCAINE HYDROCHLORIDE 0.1 ML: 10 INJECTION, SOLUTION EPIDURAL; INFILTRATION; INTRACAUDAL; PERINEURAL at 12:37

## 2022-04-29 RX ADMIN — PROPOFOL 60 MG: 10 INJECTION, EMULSION INTRAVENOUS at 13:21

## 2022-04-29 RX ADMIN — PROPOFOL 150 MCG/KG/MIN: 10 INJECTION, EMULSION INTRAVENOUS at 13:21

## 2022-04-29 NOTE — ANESTHESIA POSTPROCEDURE EVALUATION
Patient: Leia Kingston    Procedure: Procedure(s):  COLONOSCOPY       Anesthesia Type:  MAC    Note:  Disposition: Outpatient   Postop Pain Control: Uneventful            Sign Out: Well controlled pain   PONV: No   Neuro/Psych: Uneventful            Sign Out: Acceptable/Baseline neuro status   Airway/Respiratory: Uneventful            Sign Out: Acceptable/Baseline resp. status   CV/Hemodynamics: Uneventful            Sign Out: Acceptable CV status   Other NRE: NONE   DID A NON-ROUTINE EVENT OCCUR? No    Event details/Postop Comments:  Pt was happy with anesthesia care.  No complications.  I will follow up with the pt if needed.           Last vitals:  Vitals Value Taken Time   /78 04/29/22 1410   Temp     Pulse 75 04/29/22 1410   Resp     SpO2 100 % 04/29/22 1412   Vitals shown include unvalidated device data.    Electronically Signed By: SHABBIR Sorensen CRNA  April 29, 2022  2:16 PM

## 2022-04-29 NOTE — ANESTHESIA CARE TRANSFER NOTE
Patient: Leia Kingston    Procedure: Procedure(s):  COLONOSCOPY       Diagnosis: Special screening for malignant neoplasms, colon [Z12.11]  Diagnosis Additional Information: No value filed.    Anesthesia Type:   MAC     Note:    Oropharynx: oropharynx clear of all foreign objects and spontaneously breathing  Level of Consciousness: awake  Oxygen Supplementation: room air    Independent Airway: airway patency satisfactory and stable  Dentition: dentition unchanged  Vital Signs Stable: post-procedure vital signs reviewed and stable  Report to RN Given: handoff report given  Patient transferred to: Phase II    Handoff Report: Identifed the Patient, Identified the Reponsible Provider, Reviewed the pertinent medical history, Discussed the surgical course, Reviewed Intra-OP anesthesia mangement and issues during anesthesia, Set expectations for post-procedure period and Allowed opportunity for questions and acknowledgement of understanding      Vitals:  Vitals Value Taken Time   BP 99/69 04/29/22 1348   Temp     Pulse 88 04/29/22 1348   Resp     SpO2 98 % 04/29/22 1350   Vitals shown include unvalidated device data.    Electronically Signed By: SHABBIR Sorensen CRNA  April 29, 2022  1:51 PM

## 2022-04-29 NOTE — ANESTHESIA PREPROCEDURE EVALUATION
Anesthesia Pre-Procedure Evaluation    Patient: Leia Kingston   MRN: [id number] : 1960        Procedure : Procedure(s):  COLONOSCOPY          Past Medical History:   Diagnosis Date     Malignant neoplasm of breast (female), unspecified site       Past Surgical History:   Procedure Laterality Date     BX/REMV,LYMPH NODE,DEEP AXILL  2007     BX/REMV,LYMPH NODE,DEEP AXILL  2007    Left axillary dissection - Breast cancer.     COLONOSCOPY  08/02/10     HC DILATION/CURETTAGE DIAG/THER NON OB      D & C x 2     HC EXCISION BREAST LESION, OPEN >=1  07/10/2007     HC TOOTH EXTRACTION W/FORCEP      wisdom teeth      Allergies   Allergen Reactions     No Known Drug Allergies       Social History     Tobacco Use     Smoking status: Never Smoker     Smokeless tobacco: Never Used     Tobacco comment: no smokers in household   Substance Use Topics     Alcohol use: Yes     Comment: beer or wine x3/,1 x per month      Wt Readings from Last 1 Encounters:   22 63.5 kg (140 lb)        Anesthesia Evaluation   Pt has had prior anesthetic. Type: General and MAC.    No history of anesthetic complications       ROS/MED HX  ENT/Pulmonary:  - neg pulmonary ROS     Neurologic:  - neg neurologic ROS     Cardiovascular:  - neg cardiovascular ROS     METS/Exercise Tolerance:     Hematologic:  - neg hematologic  ROS     Musculoskeletal: Comment: - Fibromyalgia       GI/Hepatic:     (+) bowel prep,     Renal/Genitourinary:       Endo:       Psychiatric/Substance Use:  - neg psychiatric ROS     Infectious Disease:       Malignancy:   (+) Malignancy, History of Breast.    Other:               OUTSIDE LABS:  CBC:   Lab Results   Component Value Date    WBC 6.0 2019    WBC 4.5 2016    HGB 14.2 2019    HGB 15.0 2015    HCT 41.1 2019    HCT 43.1 2015     2019     2015     BMP:   Lab Results   Component Value Date     2015     2010     POTASSIUM 4.4 11/18/2015    POTASSIUM 4.9 07/09/2010    CHLORIDE 106 11/18/2015    CHLORIDE 102 07/09/2010    CO2 30 11/18/2015    CO2 31 07/09/2010    BUN 15 11/18/2015    BUN 18 07/09/2010    CR 0.56 11/18/2015    CR 0.79 07/02/2014    GLC 95 01/24/2017    GLC 88 11/18/2015     COAGS: No results found for: PTT, INR, FIBR  POC:   Lab Results   Component Value Date    HCG Negative 09/04/2007     HEPATIC:   Lab Results   Component Value Date    ALBUMIN 4.5 03/07/2018    PROTTOTAL 8.0 03/07/2018    ALT 42 03/07/2018    AST 22 03/07/2018    ALKPHOS 72 03/07/2018    BILITOTAL 0.4 03/07/2018     OTHER:   Lab Results   Component Value Date    RAZ 9.6 11/18/2015    PHOS 4.0 08/07/2007    MAG 2.3 08/07/2007    TSH 1.68 12/03/2019    CRP <2.9 11/18/2015       Anesthesia Plan    ASA Status:  1   NPO Status:  NPO Appropriate    Anesthesia Type: MAC.     - Reason for MAC: immobility needed   Induction: Propofol, Intravenous.   Maintenance: TIVA.        Consents    Anesthesia Plan(s) and associated risks, benefits, and realistic alternatives discussed. Questions answered and patient/representative(s) expressed understanding.     - Discussed: Risks, Benefits and Alternatives for BOTH SEDATION and the PROCEDURE were discussed     - Discussed with:  Patient      - Extended Intubation/Ventilatory Support Discussed: No.      - Patient is DNR/DNI Status: No    Use of blood products discussed: No .     Postoperative Care            Comments:    Other Comments: The risks and benefits of anesthesia, and the alternatives where applicable, have been discussed with the patient, and they wish to proceed.               SHABBIR Sorensen CRNA

## 2022-04-29 NOTE — DISCHARGE INSTRUCTIONS
[facility]    Home Care Following Endoscopy          Activity:  You have just undergone an endoscopic procedure usually performed with conscious sedation.  Do not work or operate machinery (including a car) for at least 12 hours.    I encourage you to walk and attempt to pass this air as soon as possible.    Diet:  Return to the diet you were on before your procedure but eat lightly for the first 12-24 hours.  Drink plenty of water.  Resume any regular medications unless otherwise advised by your physician.  Please begin any new medication prescribed as a result of your procedure as directed by your physician.   Pain:  You may take Tylenol as needed for pain.  Expected Recovery:  You can expect some mild abdominal fullness and/or discomfort due to the air used to inflate your intestinal tract.     Call Your Physician if You Have:    After Colonoscopy:  Worsening persisting abdominal pain which is worse with activity.  Fevers (>101 degrees F), chills or shakes.  Passage of continued blood with bowel movements.     Any questions or concerns about your recovery, please call [phone] or after hours [phone] Nurse Advice Line.

## 2022-07-20 ENCOUNTER — ALLIED HEALTH/NURSE VISIT (OUTPATIENT)
Dept: FAMILY MEDICINE | Facility: OTHER | Age: 62
End: 2022-07-20
Payer: COMMERCIAL

## 2022-07-20 VITALS — SYSTOLIC BLOOD PRESSURE: 126 MMHG | DIASTOLIC BLOOD PRESSURE: 86 MMHG

## 2022-07-20 DIAGNOSIS — Z01.30 BLOOD PRESSURE CHECK: Primary | ICD-10-CM

## 2022-07-20 PROCEDURE — 99207 PR NO CHARGE NURSE ONLY: CPT | Performed by: FAMILY MEDICINE

## 2022-07-20 NOTE — PROGRESS NOTES
Leia Kingston was evaluated at [facility] on July 20, 2022 at which time her blood pressure was:    BP Readings from Last 3 Encounters:   07/20/22 126/86   04/29/22 122/78   02/04/22 114/80     Pulse Readings from Last 3 Encounters:   04/29/22 75   02/04/22 85   12/11/20 100       Reviewed lifestyle modifications for blood pressure control and reduction: including making healthy food choices, managing weight, getting regular exercise, smoking cessation, reducing alcohol consumption, monitoring blood pressure regularly.     Symptoms: None    BP Goal:< 140/90 mmHg    BP Assessment:  BP at goal    Potential Reasons for BP too high: NA - Not applicable    BP Follow-Up Plan: Recheck BP in 6 months at pharmacy    Recommendation to Provider: none    Note completed by:  Merissa Diamond, Pharm.D., [facility] [place], [phone]

## 2022-10-09 ENCOUNTER — HEALTH MAINTENANCE LETTER (OUTPATIENT)
Age: 62
End: 2022-10-09

## 2023-01-13 ENCOUNTER — ALLIED HEALTH/NURSE VISIT (OUTPATIENT)
Dept: FAMILY MEDICINE | Facility: OTHER | Age: 63
End: 2023-01-13
Payer: COMMERCIAL

## 2023-01-13 VITALS — DIASTOLIC BLOOD PRESSURE: 84 MMHG | SYSTOLIC BLOOD PRESSURE: 138 MMHG

## 2023-01-13 DIAGNOSIS — Z01.30 BLOOD PRESSURE CHECK: Primary | ICD-10-CM

## 2023-01-13 PROCEDURE — 99207 PR NO CHARGE NURSE ONLY: CPT | Performed by: FAMILY MEDICINE

## 2023-01-13 NOTE — PROGRESS NOTES
Leia Kingston was evaluated at [facility] on January 13, 2023 at which time her blood pressure was:    BP Readings from Last 3 Encounters:   01/13/23 138/84   07/20/22 126/86   04/29/22 122/78     Pulse Readings from Last 3 Encounters:   04/29/22 75   02/04/22 85   12/11/20 100       Reviewed lifestyle modifications for blood pressure control and reduction: including making healthy food choices, managing weight, getting regular exercise, smoking cessation, reducing alcohol consumption, monitoring blood pressure regularly.     Symptoms: None    BP Goal:< 140/90 mmHg    BP Assessment:  BP at goal    Potential Reasons for BP too high: NA - Not applicable    BP Follow-Up Plan: Recheck BP in 6 months at pharmacy    Recommendation to Provider: none    Note completed by:  Merissa Diamond, Pharm.D., [facility] [place], [phone]

## 2023-03-13 ENCOUNTER — TRANSFERRED RECORDS (OUTPATIENT)
Dept: HEALTH INFORMATION MANAGEMENT | Facility: CLINIC | Age: 63
End: 2023-03-13

## 2023-03-25 ENCOUNTER — HEALTH MAINTENANCE LETTER (OUTPATIENT)
Age: 63
End: 2023-03-25

## 2023-04-06 DIAGNOSIS — N95.1 MENOPAUSAL SYNDROME (HOT FLASHES): ICD-10-CM

## 2023-04-06 RX ORDER — VENLAFAXINE HYDROCHLORIDE 37.5 MG/1
37.5 CAPSULE, EXTENDED RELEASE ORAL DAILY
Qty: 90 CAPSULE | Refills: 3 | OUTPATIENT
Start: 2023-04-06

## 2023-04-06 NOTE — TELEPHONE ENCOUNTER
Needs to schedule follow-up with TC in clinic for annual exam prior to refills.    Joaquín Victoria PA-C

## 2023-04-06 NOTE — TELEPHONE ENCOUNTER
"Pending Prescriptions:                       Disp   Refills    venlafaxine (EFFEXOR XR) 37.5 MG 24 hr cap*90 cap*3        Sig: Take 1 capsule (37.5 mg) by mouth daily        Routing refill request to provider for review/approval because:  Serotonin-Norepinephrine Reuptake Inhibitors  Failed    Rerun Protocol (4/6/2023 5:01 AM)    Recent (12 mo) or future (30 days) visit within the authorizing provider's specialty    Patient has had an office visit with the authorizing provider or a provider within the authorizing providers department within the previous 12 mos or has a future within next 30 days. See \"Patient Info\" tab in inbasket, or \"Choose Columns\" in Meds & Orders section of the refill encounter.         Normal serum creatinine on file in past 12 months        Recent Labs   Lab Test 11/18/15  1107   CR 0.56      Ok to refill medication if creatinine is low    Blood pressure under 140/90 in past 12 months        BP Readings from Last 3 Encounters:   01/13/23 138/84   07/20/22 126/86   04/29/22 122/78           Medication is active on med list      Patient is age 18 or older      No active pregnancy on record      No positive pregnancy test in past 12 months          "

## 2023-04-07 RX ORDER — VENLAFAXINE HYDROCHLORIDE 37.5 MG/1
37.5 CAPSULE, EXTENDED RELEASE ORAL DAILY
Qty: 60 CAPSULE | Refills: 0 | Status: SHIPPED | OUTPATIENT
Start: 2023-04-07 | End: 2023-05-12

## 2023-04-07 NOTE — TELEPHONE ENCOUNTER
Impression: Age-related nuclear cataract, bilateral: H25.13. Plan: Patients cataract are visually significant and affecting patients daily activities. Okay to proceed with cataract surgery. Discussed risks, benefits and alternatives to surgery including but not limited to: bleeding, infection, risk of vision loss, loss of the eye, need for other surgery. Patient voiced understanding and wishes to proceed. If Durezol/Vigamox not covered by insurance, okay to switch to generic. RIGHT EYE THEN LEFT EYE- **Pt will wait to Schedule since he has other medical issues at this time ** 
RL2
LENS - (   )?
AIM:?
MILES:? 
ORA:?
* Pt will need ASCAN testing prior to Sx* Pt understands will need glasses for BCVA after Sx. Patient called back and scheduled on 5/12.  Patient is asking if provider can fill prescription to get to appointment date please.

## 2023-05-12 ENCOUNTER — OFFICE VISIT (OUTPATIENT)
Dept: FAMILY MEDICINE | Facility: OTHER | Age: 63
End: 2023-05-12
Payer: COMMERCIAL

## 2023-05-12 VITALS
BODY MASS INDEX: 27.48 KG/M2 | WEIGHT: 140 LBS | DIASTOLIC BLOOD PRESSURE: 82 MMHG | HEIGHT: 60 IN | RESPIRATION RATE: 19 BRPM | HEART RATE: 84 BPM | OXYGEN SATURATION: 97 % | TEMPERATURE: 98.4 F | SYSTOLIC BLOOD PRESSURE: 124 MMHG

## 2023-05-12 DIAGNOSIS — D48.5 NEOPLASM OF UNCERTAIN BEHAVIOR OF SKIN: ICD-10-CM

## 2023-05-12 DIAGNOSIS — Z00.00 ROUTINE GENERAL MEDICAL EXAMINATION AT A HEALTH CARE FACILITY: Primary | ICD-10-CM

## 2023-05-12 DIAGNOSIS — N95.1 MENOPAUSAL SYNDROME (HOT FLASHES): ICD-10-CM

## 2023-05-12 LAB
CHOLEST SERPL-MCNC: 199 MG/DL
HDLC SERPL-MCNC: 60 MG/DL
LDLC SERPL CALC-MCNC: 112 MG/DL
NONHDLC SERPL-MCNC: 139 MG/DL
TRIGL SERPL-MCNC: 136 MG/DL

## 2023-05-12 PROCEDURE — 99396 PREV VISIT EST AGE 40-64: CPT | Performed by: FAMILY MEDICINE

## 2023-05-12 PROCEDURE — 36415 COLL VENOUS BLD VENIPUNCTURE: CPT | Performed by: FAMILY MEDICINE

## 2023-05-12 PROCEDURE — 80061 LIPID PANEL: CPT | Performed by: FAMILY MEDICINE

## 2023-05-12 RX ORDER — VENLAFAXINE HYDROCHLORIDE 37.5 MG/1
37.5 CAPSULE, EXTENDED RELEASE ORAL DAILY
Qty: 90 CAPSULE | Refills: 3 | Status: SHIPPED | OUTPATIENT
Start: 2023-05-12 | End: 2024-05-17

## 2023-05-12 ASSESSMENT — PAIN SCALES - GENERAL: PAINLEVEL: MILD PAIN (2)

## 2023-05-12 ASSESSMENT — ENCOUNTER SYMPTOMS
EYE PAIN: 0
CHILLS: 0
HEARTBURN: 0
NAUSEA: 0
ARTHRALGIAS: 1
WEAKNESS: 0
COUGH: 0
DIZZINESS: 0
HEADACHES: 1
SHORTNESS OF BREATH: 0
DYSURIA: 0
DIARRHEA: 0
FREQUENCY: 0
SORE THROAT: 0
BREAST MASS: 0
CONSTIPATION: 0
HEMATURIA: 0
JOINT SWELLING: 0
NERVOUS/ANXIOUS: 0
FEVER: 0
MYALGIAS: 1
PARESTHESIAS: 0
HEMATOCHEZIA: 0
PALPITATIONS: 0
ABDOMINAL PAIN: 0

## 2023-05-12 NOTE — PROGRESS NOTES
SUBJECTIVE:   CC: Leia is an 63 year old who presents for preventive health visit.       5/12/2023     1:43 PM   Additional Questions   Roomed by Sammi LOZANO   Patient has been advised of split billing requirements and indicates understanding: Yes  Healthy Habits:     Getting at least 3 servings of Calcium per day:  Yes    Bi-annual eye exam:  Yes    Dental care twice a year:  Yes    Sleep apnea or symptoms of sleep apnea:  None    Diet:  Regular (no restrictions)    Frequency of exercise:  1 day/week    Duration of exercise:  Less than 15 minutes    Taking medications regularly:  Yes    Medication side effects:  None    PHQ-2 Total Score: 0    Additional concerns today:  Yes      Skin Lesion  Onset/Duration: a while  Description  Location: back of right knee  Color: brown  Border description: irregular border, irregularly pigmented, raised  Character: round, raised, dry  Itching: no  Bleeding:  No  Intensity:  mild  Progression of Symptoms:  same  Accompanying signs and symptoms:   Bleeding: No  Scaling: No  Excessive sun exposure/tanning: No  Sunscreen used: YES  History:           Any previous history of skin cancer: No  Any family history of melanoma: YES - mother had on nose  Previous episodes of similar lesion: No  Precipitating or alleviating factors: none  Therapies tried and outcome: none      Today's PHQ-2 Score:       5/12/2023     1:37 PM   PHQ-2 ( 1999 [employer])   Q1: Little interest or pleasure in doing things 0   Q2: Feeling down, depressed or hopeless 0   PHQ-2 Score 0   Q1: Little interest or pleasure in doing things Not at all    Not at all   Q2: Feeling down, depressed or hopeless Not at all    Not at all   PHQ-2 Score 0    0           Social History     Tobacco Use     Smoking status: Never     Smokeless tobacco: Never     Tobacco comments:     no smokers in household   Vaping Use     Vaping status: Never Used   Substance Use Topics     Alcohol use: Yes     Comment: beer or wine x3/,1 x per  month             5/12/2023     1:37 PM   Alcohol Use   Prescreen: >3 drinks/day or >7 drinks/week? No     Reviewed orders with patient.  Reviewed health maintenance and updated orders accordingly - Yes    Breast Cancer Screening:    FHS-7:       2/4/2022    10:54 AM 5/12/2023     1:38 PM   Breast CA Risk Assessment (FHS-7)   Did any of your first-degree relatives have breast or ovarian cancer? No No   Did any of your relatives have bilateral breast cancer? No No   Did any man in your family have breast cancer? No No   Did any woman in your family have breast and ovarian cancer? No No   Did any woman in your family have breast cancer before age 50 y? No Yes   Do you have 2 or more relatives with breast and/or ovarian cancer? No No   Do you have 2 or more relatives with breast and/or bowel cancer? No No       Mammogram Screening -  breast cancer history  Pertinent mammograms are reviewed under the imaging tab.    History of abnormal Pap smear: NO - age 30-65 PAP every 5 years with negative HPV co-testing recommended      Latest Ref Rng & Units 12/3/2019     2:28 PM 12/3/2019     2:25 PM 11/18/2015    11:00 AM   PAP / HPV   PAP (Historical)  NIL       HPV 16 DNA NEG^Negative  Negative   Negative     HPV 18 DNA NEG^Negative  Negative   Negative     Other HR HPV NEG^Negative  Negative   Negative       Reviewed and updated as needed this visit by clinical staff   Tobacco  Allergies  Meds  Problems  Med Hx  Surg Hx  Fam Hx          Reviewed and updated as needed this visit by Provider   Tobacco  Allergies  Meds  Problems  Med Hx  Surg Hx  Fam Hx             Review of Systems   Constitutional: Negative for chills and fever.   HENT: Negative for congestion, ear pain, hearing loss and sore throat.    Eyes: Negative for pain and visual disturbance.   Respiratory: Negative for cough and shortness of breath.    Cardiovascular: Negative for chest pain, palpitations and peripheral edema.   Gastrointestinal: Negative  "for abdominal pain, constipation, diarrhea, heartburn, hematochezia and nausea.   Breasts:  Negative for tenderness, breast mass and discharge.   Genitourinary: Negative for dysuria, frequency, genital sores, hematuria, pelvic pain, urgency, vaginal bleeding and vaginal discharge.   Musculoskeletal: Positive for arthralgias and myalgias. Negative for joint swelling.   Skin: Negative for rash.   Neurological: Positive for headaches. Negative for dizziness, weakness and paresthesias.   Psychiatric/Behavioral: Negative for mood changes. The patient is not nervous/anxious.         OBJECTIVE:   /82 (Cuff Size: Adult Regular)   Pulse 84   Temp 98.4  F (36.9  C) (Oral)   Resp 19   Ht 1.534 m (5' 0.39\")   Wt 63.5 kg (140 lb)   LMP 06/02/2007   SpO2 97%   BMI 26.99 kg/m    Physical Exam  Constitutional:       General: She is not in acute distress.     Appearance: She is well-developed.   HENT:      Right Ear: Tympanic membrane and external ear normal.      Left Ear: Tympanic membrane and external ear normal.      Nose: Nose normal.   Eyes:      General:         Right eye: No discharge.         Left eye: No discharge.      Conjunctiva/sclera: Conjunctivae normal.      Pupils: Pupils are equal, round, and reactive to light.   Neck:      Thyroid: No thyroid mass.   Cardiovascular:      Rate and Rhythm: Normal rate and regular rhythm.      Heart sounds: Normal heart sounds, S1 normal and S2 normal. No murmur heard.  Pulmonary:      Effort: Pulmonary effort is normal. No respiratory distress.      Breath sounds: Normal breath sounds. No wheezing or rales.   Abdominal:      General: Bowel sounds are normal.      Palpations: Abdomen is soft. There is no mass.      Tenderness: There is no abdominal tenderness.   Musculoskeletal:         General: Normal range of motion.      Cervical back: Neck supple.   Lymphadenopathy:      Cervical: No cervical adenopathy.   Skin:     General: Skin is warm and dry.      Findings: No " "rash.      Comments: Posterior lower leg just below the knee there is a 1 cm stuck on appearing melanocytic lesion consistent with seborrheic keratosis.  Left upper arm there is a 4 mm reddish lesion with flaking and without telangiectasia.   Neurological:      Mental Status: She is alert and oriented to person, place, and time.   Psychiatric:         Mood and Affect: Mood normal.         Behavior: Behavior normal.         Thought Content: Thought content normal.         Judgment: Judgment normal.           ASSESSMENT/PLAN:   (Z00.00) Routine general medical examination at a health care facility  (primary encounter diagnosis)  Plan: Lipid panel reflex to direct LDL Non-fasting          (N95.1) Menopausal syndrome (hot flashes)  Comment: She feels venlafaxine has controlled her hot flashes.  She still gets them once in a while.  She denies constipation.  She would like to stay on the medication.  Refills were given.  Plan: venlafaxine (EFFEXOR XR) 37.5 MG 24 hr capsule          (D48.5) Neoplasm of uncertain behavior of skin  Comment: Suspect the lesion on her leg is a seborrheic keratosis.  Discussed the benign nature.  Concern for the lesion on her upper arm to be squamous cell carcinoma versus actinic keratosis.  Will refer to dermatology.  Did place referral to Dinah perez, patient states she may want to consider going to prior dermatologist that she has seen in the past but she is unclear sure who that was.  She will send me a MyChart message if she needs a referral to see that person.  Plan: Adult Dermatology Referral            COUNSELING:  Reviewed preventive health counseling, as reflected in patient instructions      BMI:   Estimated body mass index is 26.99 kg/m  as calculated from the following:    Height as of this encounter: 1.534 m (5' 0.39\").    Weight as of this encounter: 63.5 kg (140 lb).   Weight management plan: Discussed healthy diet and exercise guidelines      She reports that she has " never smoked. She has never used smokeless tobacco.      Sowmya Gao MD  [facility]

## 2023-05-14 ENCOUNTER — MYC MEDICAL ADVICE (OUTPATIENT)
Dept: FAMILY MEDICINE | Facility: OTHER | Age: 63
End: 2023-05-14
Payer: COMMERCIAL

## 2023-05-14 DIAGNOSIS — D48.5 NEOPLASM OF UNCERTAIN BEHAVIOR OF SKIN: Primary | ICD-10-CM

## 2023-05-15 NOTE — TELEPHONE ENCOUNTER
Order has been sent via [facility]ax to [facility] to schedule patient. My chart reply sent to patient.

## 2023-05-15 NOTE — TELEPHONE ENCOUNTER
RN pended referral order for [facility] for Dermatology per patient mychart request.   Please have  fax it and let patient know when done via mychart.    Thank you.  Elsie VIDES RN

## 2023-10-17 ENCOUNTER — OFFICE VISIT (OUTPATIENT)
Dept: PODIATRY | Facility: CLINIC | Age: 63
End: 2023-10-17
Payer: COMMERCIAL

## 2023-10-17 VITALS
TEMPERATURE: 98.5 F | WEIGHT: 141 LBS | BODY MASS INDEX: 27.68 KG/M2 | SYSTOLIC BLOOD PRESSURE: 142 MMHG | DIASTOLIC BLOOD PRESSURE: 76 MMHG | HEIGHT: 60 IN

## 2023-10-17 DIAGNOSIS — S92.502A CLOSED FRACTURE OF PHALANX OF LEFT FIFTH TOE, INITIAL ENCOUNTER: Primary | ICD-10-CM

## 2023-10-17 PROCEDURE — 99203 OFFICE O/P NEW LOW 30 MIN: CPT | Performed by: PODIATRIST

## 2023-10-17 ASSESSMENT — PAIN SCALES - GENERAL: PAINLEVEL: MILD PAIN (3)

## 2023-10-17 NOTE — NURSING NOTE
Dispensed 1 Pneumatic Walking Brace, Size small, with [facility] agreement signed by patient. Carla Valdez CMA, October 17, 2023

## 2023-10-17 NOTE — PROGRESS NOTES
HPI:  Leia Kingston is a 63 year old female who is seen in consultation at the request of [facility].    Pt presents for eval of:   (Onset, Location, L/R, Character, Treatments, Injury if yes)    XR Left foot 10/14/2023 pushed to PACS from [facility].     Onset 10/14/2023, stubbed toes on wooden chest when barerfoot. Walked on heel to kitchen. Presents with Left 5th toe pain, WB w/post op shoe and buddyt taping  Constant, swelling, bruising, dull ache. Intermittent throbbing pain 4/10.  Went to urgent care and they numbed it up after getting x-rays and reduced it but she feels it still crooked.    Post op shoe and sal taping, elevation, rest.     for her grandkids.    ROS:  10 point ROS neg other than the symptoms noted above in the HPI.    Patient Active Problem List   Diagnosis    History of breast cancer    Osteopenia    Fibromyalgia    Dry eye of left side       PAST MEDICAL HISTORY:   Past Medical History:   Diagnosis Date    Malignant neoplasm of breast (female), unspecified site         PAST SURGICAL HISTORY:   Past Surgical History:   Procedure Laterality Date    BX/REMV,LYMPH NODE,DEEP AXILL  07/31/2007    BX/REMV,LYMPH NODE,DEEP AXILL  09/04/2007    Left axillary dissection - Breast cancer.    COLONOSCOPY  08/02/10    COLONOSCOPY N/A 4/29/2022    Procedure: COLONOSCOPY;  Surgeon: Thiago Osorio MD;  Location: [facility]    HC DILATION/CURETTAGE DIAG/THER NON OB      D & C x 2    HC EXCISION BREAST LESION, OPEN >=1  07/10/2007    HC TOOTH EXTRACTION W/FORCEP      wisdom teeth        MEDICATIONS:   Current Outpatient Medications:     aspirin 81 MG chewable tablet, Take 1 tablet (81 mg) by mouth daily, Disp: 108 tablet, Rfl: 3    Multiple Vitamins-Minerals (THRIVE [employer] WOMENS PO), , Disp: , Rfl:     tiZANidine (ZANAFLEX) 4 MG tablet, , Disp: , Rfl: 5    traMADol (ULTRAM) 50 MG tablet, , Disp: , Rfl:     venlafaxine (EFFEXOR XR) 37.5 MG 24 hr capsule, Take 1  "capsule (37.5 mg) by mouth daily, Disp: 90 capsule, Rfl: 3    VITAMIN D, CHOLECALCIFEROL, PO, Take 2,000 Units by mouth daily, Disp: , Rfl:      ALLERGIES:    Allergies   Allergen Reactions    No Known Drug Allergy         SOCIAL HISTORY:   Social History     Socioeconomic History    Marital status:      Spouse name: Deejay    Number of children: 6    Years of education: Not on file    Highest education level: Not on file   Occupational History     Employer: [employer]OMEM[employer]ER   Tobacco Use    Smoking status: Never    Smokeless tobacco: Never    Tobacco comments:     no smokers in household   Vaping Use    Vaping Use: Never used   Substance and Sexual Activity    Alcohol use: Yes     Comment: beer or wine x3/,1 x per month    Drug use: No    Sexual activity: Not Currently     Partners: Male     Comment: no b/c   Other Topics Concern     Service No    Blood Transfusions No    Caffeine Concern No     Comment: 1 cup coffee/day    Occupational Exposure No    Hobby Hazards No    Sleep Concern No    Stress Concern No    Weight Concern No    Special Diet No    Back Care No    Exercise Yes     Comment: walks    Bike Helmet Not Asked    Seat Belt Yes    Self-Exams Yes     Comment: periodically   Social History Narrative    Not on file     Social Determinants of Health     Financial Resource Strain: Not on file   Food Insecurity: Not on file   Transportation Needs: Not on file   Physical Activity: Not on file   Stress: Not on file   Social Connections: Not on file   Interpersonal Safety: Not on file   Housing Stability: Not on file        FAMILY HISTORY:   Family History   Problem Relation Age of Onset    C.A.D. Father          MI age 47    Osteoporosis Mother     Hypertension Mother     Osteoporosis Sister         EXAM:Vitals: BP (!) 142/76 (BP Location: Right arm, Patient Position: Sitting, Cuff Size: Adult Regular)   Temp 98.5  F (36.9  C) (Temporal)   Ht 1.534 m (5' 0.39\")   Wt 64 kg (141 lb)   LMP " 06/02/2007   BMI 27.18 kg/m    BMI= Body mass index is 27.18 kg/m .    General appearance: Patient is alert and fully cooperative with history & exam.  No sign of distress is noted during the visit.     Psychiatric: Affect is pleasant & appropriate.  Patient appears motivated to improve health.     Respiratory: Breathing is regular & unlabored while sitting.     HEENT: Hearing is intact to spoken word.  Speech is clear.  No gross evidence of visual impairment that would impact ambulation.     Vascular: DP & PT pulses are intact & regular bilaterally.  No significant edema or varicosities noted.  CFT and skin temperature is normal to both lower extremities.     Neurologic: Lower extremity sensation is intact to light touch.  No evidence of weakness or contracture in the lower extremities.  No evidence of neuropathy.    Dermatologic: Skin is intact to both lower extremities with adequate texture, turgor and tone about the integument.  No paronychia or evidence of soft tissue infection is noted.     Musculoskeletal: Patient is ambulatory without assistive device or brace.  No gross ankle deformity noted.  No foot or ankle joint effusion is noted.  There is subtle lateral deviation of the fifth toe.  Edema ecchymosis surrounding the left fifth toe by 4 cm.    Prereduction radiographs demonstrating oblique fracture of the head of the fifth metatarsal proximal phalanx.  Considerable lateral deviation prereduction.     ASSESSMENT:       ICD-10-CM    1. Closed fracture of phalanx of left fifth toe, initial encounter  S92.502A Ankle/Foot Bracing Supplies DME Walking Boot; Left; Pneumatic; Tall     XR Foot Left G/E 3 Views           PLAN:  Reviewed patient[facility] in [facility].      10/17/2023   Interpreted radiographs  Recommended sal splinting and fracture boot to better immobilize the ankle and tendons which across the fracture site.  Recommend weightbearing to tolerance in the fracture boot and keep the compression on day  and night until edema is resolved.  Keep the fracture boot in place as well day and night until pain is resolved 10 days then can begin not utilizing the fracture boot during sleep and rest.  Follow-up again in 3 weeks to repeat imaging.      Julio Rodriguez DPM

## 2023-10-17 NOTE — LETTER
10/17/2023         RE: Leia Kingston  [address]  formerly [facility] [address]        Dear Colleague,    Thank you for referring your patient, Leia Kingston, to the [facility]. Please see a copy of my visit note below.    HPI:  Leia Kingston is a 63 year old female who is seen in consultation at the request of [facility].    Pt presents for eval of:   (Onset, Location, L/R, Character, Treatments, Injury if yes)    XR Left foot 10/14/2023 pushed to PACS from [facility].     Onset 10/14/2023, stubbed toes on wooden chest when barerfoot. Walked on heel to kitchen. Presents with Left 5th toe pain, WB w/post op shoe and buddyt taping  Constant, swelling, bruising, dull ache. Intermittent throbbing pain 4/10.  Went to urgent care and they numbed it up after getting x-rays and reduced it but she feels it still crooked.    Post op shoe and sal taping, elevation, rest.     for her grandkids.    ROS:  10 point ROS neg other than the symptoms noted above in the HPI.    Patient Active Problem List   Diagnosis     History of breast cancer     Osteopenia     Fibromyalgia     Dry eye of left side       PAST MEDICAL HISTORY:   Past Medical History:   Diagnosis Date     Malignant neoplasm of breast (female), unspecified site         PAST SURGICAL HISTORY:   Past Surgical History:   Procedure Laterality Date     BX/REMV,LYMPH NODE,DEEP AXILL  07/31/2007     BX/REMV,LYMPH NODE,DEEP AXILL  09/04/2007    Left axillary dissection - Breast cancer.     COLONOSCOPY  08/02/10     COLONOSCOPY N/A 4/29/2022    Procedure: COLONOSCOPY;  Surgeon: Thiago Osorio MD;  Location: [facility]     HC DILATION/CURETTAGE DIAG/THER NON OB      D & C x 2     HC EXCISION BREAST LESION, OPEN >=1  07/10/2007     HC TOOTH EXTRACTION W/FORCEP      wisdom teeth        MEDICATIONS:   Current Outpatient Medications:      aspirin 81 MG chewable tablet, Take 1 tablet (81 mg) by  mouth daily, Disp: 108 tablet, Rfl: 3     Multiple Vitamins-Minerals (THRIVE FOR LIFE WOMENS PO), , Disp: , Rfl:      tiZANidine (ZANAFLEX) 4 MG tablet, , Disp: , Rfl: 5     traMADol (ULTRAM) 50 MG tablet, , Disp: , Rfl:      venlafaxine (EFFEXOR XR) 37.5 MG 24 hr capsule, Take 1 capsule (37.5 mg) by mouth daily, Disp: 90 capsule, Rfl: 3     VITAMIN D, CHOLECALCIFEROL, PO, Take 2,000 Units by mouth daily, Disp: , Rfl:      ALLERGIES:    Allergies   Allergen Reactions     No Known Drug Allergy         SOCIAL HISTORY:   Social History     Socioeconomic History     Marital status:      Spouse name: Deejay     Number of children: 6     Years of education: Not on file     Highest education level: Not on file   Occupational History     Employer: HOMEMAKER   Tobacco Use     Smoking status: Never     Smokeless tobacco: Never     Tobacco comments:     no smokers in household   Vaping Use     Vaping Use: Never used   Substance and Sexual Activity     Alcohol use: Yes     Comment: beer or wine x3/,1 x per month     Drug use: No     Sexual activity: Not Currently     Partners: Male     Comment: no b/c   Other Topics Concern      Service No     Blood Transfusions No     Caffeine Concern No     Comment: 1 cup coffee/day     Occupational Exposure No     Hobby Hazards No     Sleep Concern No     Stress Concern No     Weight Concern No     Special Diet No     Back Care No     Exercise Yes     Comment: walks     Bike Helmet Not Asked     Seat Belt Yes     Self-Exams Yes     Comment: periodically   Social History Narrative     Not on file     Social Determinants of Health     Financial Resource Strain: Not on file   Food Insecurity: Not on file   Transportation Needs: Not on file   Physical Activity: Not on file   Stress: Not on file   Social Connections: Not on file   Interpersonal Safety: Not on file   Housing Stability: Not on file        FAMILY HISTORY:   Family History   Problem Relation Age of Onset     C.A.D.  "Father          MI age 47     Osteoporosis Mother      Hypertension Mother      Osteoporosis Sister         EXAM:Vitals: BP (!) 142/76 (BP Location: Right arm, Patient Position: Sitting, Cuff Size: Adult Regular)   Temp 98.5  F (36.9  C) (Temporal)   Ht 1.534 m (5' 0.39\")   Wt 64 kg (141 lb)   LMP 2007   BMI 27.18 kg/m    BMI= Body mass index is 27.18 kg/m .    General appearance: Patient is alert and fully cooperative with history & exam.  No sign of distress is noted during the visit.     Psychiatric: Affect is pleasant & appropriate.  Patient appears motivated to improve health.     Respiratory: Breathing is regular & unlabored while sitting.     HEENT: Hearing is intact to spoken word.  Speech is clear.  No gross evidence of visual impairment that would impact ambulation.     Vascular: DP & PT pulses are intact & regular bilaterally.  No significant edema or varicosities noted.  CFT and skin temperature is normal to both lower extremities.     Neurologic: Lower extremity sensation is intact to light touch.  No evidence of weakness or contracture in the lower extremities.  No evidence of neuropathy.    Dermatologic: Skin is intact to both lower extremities with adequate texture, turgor and tone about the integument.  No paronychia or evidence of soft tissue infection is noted.     Musculoskeletal: Patient is ambulatory without assistive device or brace.  No gross ankle deformity noted.  No foot or ankle joint effusion is noted.  There is subtle lateral deviation of the fifth toe.  Edema ecchymosis surrounding the left fifth toe by 4 cm.    Prereduction radiographs demonstrating oblique fracture of the head of the fifth metatarsal proximal phalanx.  Considerable lateral deviation prereduction.     ASSESSMENT:       ICD-10-CM    1. Closed fracture of phalanx of left fifth toe, initial encounter  S92.502A Ankle/Foot Bracing Supplies DME Walking Boot; Left; Pneumatic; Tall     XR Foot Left G/E 3 Views "           PLAN:  Reviewed patient's chart in [facility].      10/17/2023   Interpreted radiographs  Recommended sal splinting and fracture boot to better immobilize the ankle and tendons which across the fracture site.  Recommend weightbearing to tolerance in the fracture boot and keep the compression on day and night until edema is resolved.  Keep the fracture boot in place as well day and night until pain is resolved 10 days then can begin not utilizing the fracture boot during sleep and rest.  Follow-up again in 3 weeks to repeat imaging.      Julio Rodriguez DPM      Again, thank you for allowing me to participate in the care of your patient.        Sincerely,        Julio Rodriguez DPM

## 2023-11-06 ENCOUNTER — ANCILLARY PROCEDURE (OUTPATIENT)
Dept: GENERAL RADIOLOGY | Facility: CLINIC | Age: 63
End: 2023-11-06
Attending: PODIATRIST
Payer: COMMERCIAL

## 2023-11-06 ENCOUNTER — OFFICE VISIT (OUTPATIENT)
Dept: PODIATRY | Facility: CLINIC | Age: 63
End: 2023-11-06
Payer: COMMERCIAL

## 2023-11-06 VITALS
HEIGHT: 60 IN | WEIGHT: 141 LBS | BODY MASS INDEX: 27.68 KG/M2 | SYSTOLIC BLOOD PRESSURE: 152 MMHG | DIASTOLIC BLOOD PRESSURE: 82 MMHG | TEMPERATURE: 97.9 F

## 2023-11-06 DIAGNOSIS — S92.502A CLOSED FRACTURE OF PHALANX OF LEFT FIFTH TOE, INITIAL ENCOUNTER: Primary | ICD-10-CM

## 2023-11-06 DIAGNOSIS — S92.502A CLOSED FRACTURE OF PHALANX OF LEFT FIFTH TOE, INITIAL ENCOUNTER: ICD-10-CM

## 2023-11-06 PROCEDURE — 73630 X-RAY EXAM OF FOOT: CPT | Mod: TC | Performed by: RADIOLOGY

## 2023-11-06 PROCEDURE — 99213 OFFICE O/P EST LOW 20 MIN: CPT | Performed by: PODIATRIST

## 2023-11-06 ASSESSMENT — PAIN SCALES - GENERAL: PAINLEVEL: NO PAIN (1)

## 2023-11-06 NOTE — PROGRESS NOTES
Chief Complaint   Patient presents with    RECHECK     (3w2d) WB w/tall gray fx boot and sal taping, re-injury while wearing fx boot 10/28/23 and 11/2/23, Left 5th phalanx fx, DOI 10/14/2023; XR L foot 11/6/2023; LOV 10/17/2023     HPI:  Leia Kingston is a 63 year old female who is seen in consultation at the request of [facility].    Pt presents for eval of:   (Onset, Location, L/R, Character, Treatments, Injury if yes)    XR Left foot 10/14/2023 pushed to PACS from [facility].     Onset 10/14/2023, stubbed toes on wooden chest when barerfoot. Walked on heel to kitchen. Presents with Left 5th toe pain, WB w/post op shoe and buddyt taping  Constant, swelling, bruising, dull ache. Intermittent throbbing pain 4/10.  Went to urgent care and they numbed it up after getting x-rays and reduced it but she feels it still crooked.    Fracture boot and sal taping, elevation, rest.     for her grandkids.    ROS:  10 point ROS neg other than the symptoms noted above in the HPI.    Patient Active Problem List   Diagnosis    History of breast cancer    Osteopenia    Fibromyalgia    Dry eye of left side       PAST MEDICAL HISTORY:   Past Medical History:   Diagnosis Date    Malignant neoplasm of breast (female), unspecified site         PAST SURGICAL HISTORY:   Past Surgical History:   Procedure Laterality Date    BX/REMV,LYMPH NODE,DEEP AXILL  07/31/2007    BX/REMV,LYMPH NODE,DEEP AXILL  09/04/2007    Left axillary dissection - Breast cancer.    COLONOSCOPY  08/02/10    COLONOSCOPY N/A 4/29/2022    Procedure: COLONOSCOPY;  Surgeon: Thiago Osorio MD;  Location: [facility]    HC DILATION/CURETTAGE DIAG/THER NON OB      D & C x 2    HC EXCISION BREAST LESION, OPEN >=1  07/10/2007    HC TOOTH EXTRACTION W/FORCEP      wisdom teeth        MEDICATIONS:   Current Outpatient Medications:     aspirin 81 MG chewable tablet, Take 1 tablet (81 mg) by mouth daily, Disp: 108 tablet, Rfl: 3     Multiple Vitamins-Minerals (THRIVE FOR LIFE WOMENS PO), , Disp: , Rfl:     tiZANidine (ZANAFLEX) 4 MG tablet, , Disp: , Rfl: 5    traMADol (ULTRAM) 50 MG tablet, , Disp: , Rfl:     venlafaxine (EFFEXOR XR) 37.5 MG 24 hr capsule, Take 1 capsule (37.5 mg) by mouth daily, Disp: 90 capsule, Rfl: 3    VITAMIN D, CHOLECALCIFEROL, PO, Take 2,000 Units by mouth daily, Disp: , Rfl:      ALLERGIES:    Allergies   Allergen Reactions    No Known Drug Allergy         SOCIAL HISTORY:   Social History     Socioeconomic History    Marital status:      Spouse name: Deejay    Number of children: 6    Years of education: Not on file    Highest education level: Not on file   Occupational History     Employer: HOMEMAKER   Tobacco Use    Smoking status: Never    Smokeless tobacco: Never    Tobacco comments:     no smokers in household   Vaping Use    Vaping Use: Never used   Substance and Sexual Activity    Alcohol use: Yes     Comment: beer or wine x3/,1 x per month    Drug use: No    Sexual activity: Not Currently     Partners: Male     Comment: no b/c   Other Topics Concern     Service No    Blood Transfusions No    Caffeine Concern No     Comment: 1 cup coffee/day    Occupational Exposure No    Hobby Hazards No    Sleep Concern No    Stress Concern No    Weight Concern No    Special Diet No    Back Care No    Exercise Yes     Comment: walks    Bike Helmet Not Asked    Seat Belt Yes    Self-Exams Yes     Comment: periodically   Social History Narrative    Not on file     Social Determinants of Health     Financial Resource Strain: Not on file   Food Insecurity: Not on file   Transportation Needs: Not on file   Physical Activity: Not on file   Stress: Not on file   Social Connections: Not on file   Interpersonal Safety: Not on file   Housing Stability: Not on file        FAMILY HISTORY:   Family History   Problem Relation Age of Onset    C.A.D. Father          MI age 47    Osteoporosis Mother     Hypertension Mother  "    Osteoporosis Sister         EXAM:Vitals: BP (!) 152/82 (BP Location: Right arm, Patient Position: Sitting, Cuff Size: Adult Regular)   Temp 97.9  F (36.6  C) (Temporal)   Ht 1.534 m (5' 0.39\")   Wt 64 kg (141 lb)   LMP 06/02/2007   BMI 27.18 kg/m    BMI= Body mass index is 27.18 kg/m .    General appearance: Patient is alert and fully cooperative with history & exam.  No sign of distress is noted during the visit.     Psychiatric: Affect is pleasant & appropriate.  Patient appears motivated to improve health.     Respiratory: Breathing is regular & unlabored while sitting.     HEENT: Hearing is intact to spoken word.  Speech is clear.  No gross evidence of visual impairment that would impact ambulation.     Vascular: DP & PT pulses are intact & regular bilaterally.  No significant edema or varicosities noted.  CFT and skin temperature is normal to both lower extremities.     Neurologic: Lower extremity sensation is intact to light touch.  No evidence of weakness or contracture in the lower extremities.  No evidence of neuropathy.    Dermatologic: Skin is intact to both lower extremities with adequate texture, turgor and tone about the integument.  No paronychia or evidence of soft tissue infection is noted.     Musculoskeletal: Patient is ambulatory without assistive device or brace.  No gross ankle deformity noted.  No foot or ankle joint effusion is noted.  There is subtle lateral deviation of the fifth toe.  Edema ecchymosis surrounding the left fifth toe by 4 cm.    Prereduction radiographs demonstrating oblique fracture of the head of the fifth metatarsal proximal phalanx.  Considerable lateral deviation prereduction.     ASSESSMENT:       ICD-10-CM    1. Closed fracture of phalanx of left fifth toe, initial encounter  S92.502A              PLAN:  Reviewed patient's chart in [facility].      10/17/2023   Interpreted radiographs  Recommended sal splinting and fracture boot to better immobilize the ankle and " tendons which across the fracture site.  Recommend weightbearing to tolerance in the fracture boot and keep the compression on day and night until edema is resolved.  Keep the fracture boot in place as well day and night until pain is resolved 10 days then can begin not utilizing the fracture boot during sleep and rest.  Follow-up again in 3 weeks to repeat imaging.    11/6/2023  My come out of protection now during rest and sleep if no pain or edema.  Then stop cmpression during night and day now if tolerated.    After 6 weeks no protection needed  Xrays obtained and interpreted today  RTC prn      Julio Rodriguez DPM

## 2023-11-06 NOTE — LETTER
11/6/2023         RE: Leia Kingston  [address]  [facility] [address]        Dear Colleague,    Thank you for referring your patient, Leia Kingston, to the [facility]. Please see a copy of my visit note below.    Chief Complaint   Patient presents with     RECHECK     (3w2d) WB w/tall gray fx boot and sal taping, re-injury while wearing fx boot 10/28/23 and 11/2/23, Left 5th phalanx fx, DOI 10/14/2023; XR L foot 11/6/2023; LOV 10/17/2023     HPI:  Leia Kingston is a 63 year old female who is seen in consultation at the request of [facility].    Pt presents for eval of:   (Onset, Location, L/R, Character, Treatments, Injury if yes)    XR Left foot 10/14/2023 pushed to PACS from [facility].     Onset 10/14/2023, stubbed toes on wooden chest when barerfoot. Walked on heel to kitchen. Presents with Left 5th toe pain, WB w/post op shoe and buddyt taping  Constant, swelling, bruising, dull ache. Intermittent throbbing pain 4/10.  Went to urgent care and they numbed it up after getting x-rays and reduced it but she feels it still crooked.    Fracture boot and sal taping, elevation, rest.     for her grandkids.    ROS:  10 point ROS neg other than the symptoms noted above in the HPI.    Patient Active Problem List   Diagnosis     History of breast cancer     Osteopenia     Fibromyalgia     Dry eye of left side       PAST MEDICAL HISTORY:   Past Medical History:   Diagnosis Date     Malignant neoplasm of breast (female), unspecified site         PAST SURGICAL HISTORY:   Past Surgical History:   Procedure Laterality Date     BX/REMV,LYMPH NODE,DEEP AXILL  07/31/2007     BX/REMV,LYMPH NODE,DEEP AXILL  09/04/2007    Left axillary dissection - Breast cancer.     COLONOSCOPY  08/02/10     COLONOSCOPY N/A 4/29/2022    Procedure: COLONOSCOPY;  Surgeon: Thiago Osorio MD;  Location: [facility]     HC DILATION/CURETTAGE DIAG/THER NON OB       D & C x 2     HC EXCISION BREAST LESION, OPEN >=1  07/10/2007     HC TOOTH EXTRACTION W/FORCEP      wisdom teeth        MEDICATIONS:   Current Outpatient Medications:      aspirin 81 MG chewable tablet, Take 1 tablet (81 mg) by mouth daily, Disp: 108 tablet, Rfl: 3     Multiple Vitamins-Minerals (THRIVE FOR LIFE WOMENS PO), , Disp: , Rfl:      tiZANidine (ZANAFLEX) 4 MG tablet, , Disp: , Rfl: 5     traMADol (ULTRAM) 50 MG tablet, , Disp: , Rfl:      venlafaxine (EFFEXOR XR) 37.5 MG 24 hr capsule, Take 1 capsule (37.5 mg) by mouth daily, Disp: 90 capsule, Rfl: 3     VITAMIN D, CHOLECALCIFEROL, PO, Take 2,000 Units by mouth daily, Disp: , Rfl:      ALLERGIES:    Allergies   Allergen Reactions     No Known Drug Allergy         SOCIAL HISTORY:   Social History     Socioeconomic History     Marital status:      Spouse name: Deejay     Number of children: 6     Years of education: Not on file     Highest education level: Not on file   Occupational History     Employer: HOMEMAKER   Tobacco Use     Smoking status: Never     Smokeless tobacco: Never     Tobacco comments:     no smokers in household   Vaping Use     Vaping Use: Never used   Substance and Sexual Activity     Alcohol use: Yes     Comment: beer or wine x3/,1 x per month     Drug use: No     Sexual activity: Not Currently     Partners: Male     Comment: no b/c   Other Topics Concern      Service No     Blood Transfusions No     Caffeine Concern No     Comment: 1 cup coffee/day     Occupational Exposure No     Hobby Hazards No     Sleep Concern No     Stress Concern No     Weight Concern No     Special Diet No     Back Care No     Exercise Yes     Comment: walks     Bike Helmet Not Asked     Seat Belt Yes     Self-Exams Yes     Comment: periodically   Social History Narrative     Not on file     Social Determinants of Health     Financial Resource Strain: Not on file   Food Insecurity: Not on file   Transportation Needs: Not on file   Physical  "Activity: Not on file   Stress: Not on file   Social Connections: Not on file   Interpersonal Safety: Not on file   Housing Stability: Not on file        FAMILY HISTORY:   Family History   Problem Relation Age of Onset     C.A.D. Father          MI age 47     Osteoporosis Mother      Hypertension Mother      Osteoporosis Sister         EXAM:Vitals: BP (!) 152/82 (BP Location: Right arm, Patient Position: Sitting, Cuff Size: Adult Regular)   Temp 97.9  F (36.6  C) (Temporal)   Ht 1.534 m (5' 0.39\")   Wt 64 kg (141 lb)   LMP 2007   BMI 27.18 kg/m    BMI= Body mass index is 27.18 kg/m .    General appearance: Patient is alert and fully cooperative with history & exam.  No sign of distress is noted during the visit.     Psychiatric: Affect is pleasant & appropriate.  Patient appears motivated to improve health.     Respiratory: Breathing is regular & unlabored while sitting.     HEENT: Hearing is intact to spoken word.  Speech is clear.  No gross evidence of visual impairment that would impact ambulation.     Vascular: DP & PT pulses are intact & regular bilaterally.  No significant edema or varicosities noted.  CFT and skin temperature is normal to both lower extremities.     Neurologic: Lower extremity sensation is intact to light touch.  No evidence of weakness or contracture in the lower extremities.  No evidence of neuropathy.    Dermatologic: Skin is intact to both lower extremities with adequate texture, turgor and tone about the integument.  No paronychia or evidence of soft tissue infection is noted.     Musculoskeletal: Patient is ambulatory without assistive device or brace.  No gross ankle deformity noted.  No foot or ankle joint effusion is noted.  There is subtle lateral deviation of the fifth toe.  Edema ecchymosis surrounding the left fifth toe by 4 cm.    Prereduction radiographs demonstrating oblique fracture of the head of the fifth metatarsal proximal phalanx.  Considerable lateral " deviation prereduction.     ASSESSMENT:       ICD-10-CM    1. Closed fracture of phalanx of left fifth toe, initial encounter  S92.502A              PLAN:  Reviewed patient's chart in [facility].      10/17/2023   Interpreted radiographs  Recommended sal splinting and fracture boot to better immobilize the ankle and tendons which across the fracture site.  Recommend weightbearing to tolerance in the fracture boot and keep the compression on day and night until edema is resolved.  Keep the fracture boot in place as well day and night until pain is resolved 10 days then can begin not utilizing the fracture boot during sleep and rest.  Follow-up again in 3 weeks to repeat imaging.    11/6/2023  My come out of protection now during rest and sleep if no pain or edema.  Then stop cmpression during night and day now if tolerated.    After 6 weeks no protection needed  Xrays obtained and interpreted today  RTC prn      Julio Rodriguez DPM            Again, thank you for allowing me to participate in the care of your patient.        Sincerely,        Julio Rodriguez DPM

## 2024-02-03 ENCOUNTER — E-VISIT (OUTPATIENT)
Dept: URGENT CARE | Facility: CLINIC | Age: 64
End: 2024-02-03
Payer: COMMERCIAL

## 2024-02-03 DIAGNOSIS — J02.9 SORE THROAT: Primary | ICD-10-CM

## 2024-02-03 PROCEDURE — 99207 PR NON-BILLABLE SERV PER CHARTING: CPT | Performed by: NURSE PRACTITIONER

## 2024-02-03 NOTE — PATIENT INSTRUCTIONS
Dear Leia,    After reviewing your responses, I would like you to come in for a swab to make sure we treat you correctly. This swab is to evaluate you for possible Strep Throat, and should be scheduled for today or tomorrow. Please use the Schedule Now button in [employer] to schedule your swab. Otherwise, click this link to schedule a lab only appointment.    Lab appointments are not available at most locations on the weekends. If no Lab Only appointment is available, you should be seen in any of our convenient Urgent Care Centers for an in person visit, which can be found on our website here.    You will receive instructions with your results in [employer] once they are available.     If your symptoms worsen, you develop difficulty breathing, difficulty with drinking enough to stay hydrated, difficulty swallowing your saliva or have fevers for more than 5 days, please contact your primary care provider for an appointment or visit an Urgent Care Center to be seen.      Thanks again for choosing us as your health care partner.   Sasha Gurrola, CNP    We would need a positive test to give out antibiotics.

## 2024-02-12 ENCOUNTER — PATIENT OUTREACH (OUTPATIENT)
Dept: CARE COORDINATION | Facility: CLINIC | Age: 64
End: 2024-02-12
Payer: COMMERCIAL

## 2024-03-11 ENCOUNTER — PATIENT OUTREACH (OUTPATIENT)
Dept: CARE COORDINATION | Facility: CLINIC | Age: 64
End: 2024-03-11
Payer: COMMERCIAL

## 2024-03-25 ENCOUNTER — MYC MEDICAL ADVICE (OUTPATIENT)
Dept: FAMILY MEDICINE | Facility: OTHER | Age: 64
End: 2024-03-25
Payer: COMMERCIAL

## 2024-03-25 DIAGNOSIS — M85.80 OSTEOPENIA, UNSPECIFIED LOCATION: Primary | ICD-10-CM

## 2024-03-25 NOTE — TELEPHONE ENCOUNTER
To PCP to advise please on dexascan orders; patient requesting to be have done through [place].  Per chart last recorded Dexascan was in 2010 through [facility].    Per her [facility] note 12/4/23:  Marylou Colin MBBS - 12/04/2023 11:15 AM CST   Formatting of this note might be different from the original.  CHECK DEXA SCAN THROUGH PCP  TO R/O OSTEOPOROSIS.     Thank you.  Elsie KZayda RN

## 2024-03-26 NOTE — TELEPHONE ENCOUNTER
After speaking with patient, pt would like order faxed to [facility] as [place] does not do dexascans any longer. Faxed to [phone]. Pt was given scheduling number for [facility] also.

## 2024-04-12 ENCOUNTER — PATIENT OUTREACH (OUTPATIENT)
Dept: CARE COORDINATION | Facility: CLINIC | Age: 64
End: 2024-04-12
Payer: COMMERCIAL

## 2024-05-16 SDOH — HEALTH STABILITY: PHYSICAL HEALTH: ON AVERAGE, HOW MANY DAYS PER WEEK DO YOU ENGAGE IN MODERATE TO STRENUOUS EXERCISE (LIKE A BRISK WALK)?: 2 DAYS

## 2024-05-16 SDOH — HEALTH STABILITY: PHYSICAL HEALTH: ON AVERAGE, HOW MANY MINUTES DO YOU ENGAGE IN EXERCISE AT THIS LEVEL?: 20 MIN

## 2024-05-16 ASSESSMENT — SOCIAL DETERMINANTS OF HEALTH (SDOH): HOW OFTEN DO YOU GET TOGETHER WITH FRIENDS OR RELATIVES?: PATIENT DECLINED

## 2024-05-17 ENCOUNTER — OFFICE VISIT (OUTPATIENT)
Dept: FAMILY MEDICINE | Facility: OTHER | Age: 64
End: 2024-05-17
Payer: COMMERCIAL

## 2024-05-17 VITALS
SYSTOLIC BLOOD PRESSURE: 120 MMHG | HEIGHT: 62 IN | HEART RATE: 95 BPM | DIASTOLIC BLOOD PRESSURE: 72 MMHG | RESPIRATION RATE: 20 BRPM | BODY MASS INDEX: 26.13 KG/M2 | OXYGEN SATURATION: 95 % | WEIGHT: 142 LBS | TEMPERATURE: 97.4 F

## 2024-05-17 DIAGNOSIS — Z00.00 ROUTINE GENERAL MEDICAL EXAMINATION AT A HEALTH CARE FACILITY: Primary | ICD-10-CM

## 2024-05-17 DIAGNOSIS — N95.1 MENOPAUSAL SYNDROME (HOT FLASHES): ICD-10-CM

## 2024-05-17 DIAGNOSIS — M85.80 OSTEOPENIA, UNSPECIFIED LOCATION: ICD-10-CM

## 2024-05-17 DIAGNOSIS — Z13.1 SCREENING FOR DIABETES MELLITUS: ICD-10-CM

## 2024-05-17 DIAGNOSIS — M35.00 SICCA, UNSPECIFIED TYPE (H): ICD-10-CM

## 2024-05-17 LAB — HBA1C MFR BLD: 5.5 % (ref 0–5.6)

## 2024-05-17 PROCEDURE — 99396 PREV VISIT EST AGE 40-64: CPT | Performed by: FAMILY MEDICINE

## 2024-05-17 PROCEDURE — 36415 COLL VENOUS BLD VENIPUNCTURE: CPT | Performed by: FAMILY MEDICINE

## 2024-05-17 PROCEDURE — 82306 VITAMIN D 25 HYDROXY: CPT | Performed by: FAMILY MEDICINE

## 2024-05-17 PROCEDURE — 99213 OFFICE O/P EST LOW 20 MIN: CPT | Mod: 25 | Performed by: FAMILY MEDICINE

## 2024-05-17 PROCEDURE — 83036 HEMOGLOBIN GLYCOSYLATED A1C: CPT | Performed by: FAMILY MEDICINE

## 2024-05-17 RX ORDER — FLUOCINONIDE 0.5 MG/G
OINTMENT TOPICAL PRN
COMMUNITY
Start: 2023-10-02

## 2024-05-17 RX ORDER — CALCIUM CARBONATE 500(1250)
1 TABLET ORAL 2 TIMES DAILY
COMMUNITY

## 2024-05-17 RX ORDER — VENLAFAXINE HYDROCHLORIDE 37.5 MG/1
37.5 CAPSULE, EXTENDED RELEASE ORAL DAILY
Qty: 90 CAPSULE | Refills: 3 | Status: SHIPPED | OUTPATIENT
Start: 2024-05-17

## 2024-05-17 ASSESSMENT — PAIN SCALES - GENERAL: PAINLEVEL: NO PAIN (0)

## 2024-05-17 NOTE — PROGRESS NOTES
"Preventive Care Visit  [facility]  Sowmya Gao MD, Family Medicine  May 17, 2024      Assessment & Plan     Routine general medical examination at a health care facility    Menopausal syndrome (hot flashes)  Denies any problems with the venlafaxine.  She had been put on this to help out with hot flashes.  Discussed that she may want to consider trying to get off the medication.  She could take this every other day and if she does well she could then stop it.  If her symptoms recur she could always go back to the previous dosing.    - venlafaxine (EFFEXOR XR) 37.5 MG 24 hr capsule; Take 1 capsule (37.5 mg) by mouth daily    Screening for diabetes mellitus  - Hemoglobin A1c    Osteopenia, unspecified location  Has previous history of using Fosamax.  She has been off of this for a while now.  Repeat bone density was very similar to the bone density done in 2010.  Will check a vitamin D level.  Recheck her bone density in 2 years.    - Vitamin D Deficiency    Sicca, unspecified type (H24)  Chronic.  Did discuss that venlafaxine could be affecting it.  Could try getting off venlafaxine to see if this helps.        BMI  Estimated body mass index is 26.3 kg/m  as calculated from the following:    Height as of this encounter: 1.565 m (5' 1.61\").    Weight as of this encounter: 64.4 kg (142 lb).   Weight management plan: Discussed healthy diet and exercise guidelines    Counseling  Appropriate preventive services were discussed with this patient, including applicable screening as appropriate for fall prevention, nutrition, physical activity, Tobacco-use cessation, weight loss and cognition.  Checklist reviewing preventive services available has been given to the patient.  Reviewed patient's diet, addressing concerns and/or questions.   She is at risk for lack of exercise and has been provided with information to increase physical activity for the benefit of her well-being.       Subjective "   Leia is a 64 year old, presenting for the following:  Physical        5/17/2024     9:50 AM   Additional Questions   Roomed by sarah grace        Via the Health Maintenance questionnaire, the patient has reported the following services have been completed -Mammogram: [facility] 2023-03-13, this information has been sent to the abstraction team.  Health Care Directive  Patient does not have a Health Care Directive or Living Will: Discussed advance care planning with patient; information given to patient to review.    HPI        5/16/2024   General Health   How would you rate your overall physical health? Good   Feel stress (tense, anxious, or unable to sleep) Not at all         5/16/2024   Nutrition   Three or more servings of calcium each day? Yes   Diet: Regular (no restrictions)   How many servings of fruit and vegetables per day? (!) 2-3   How many sweetened beverages each day? 0-1         5/16/2024   Exercise   Days per week of moderate/strenous exercise 2 days   Average minutes spent exercising at this level 20 min   (!) EXERCISE CONCERN      5/16/2024   Social Factors   Frequency of gathering with friends or relatives Patient declined   Worry food won't last until get money to buy more No   Food not last or not have enough money for food? No   Do you have housing?  Yes   Are you worried about losing your housing? No   Lack of transportation? No   Unable to get utilities (heat,electricity)? No         5/16/2024   Fall Risk   Fallen 2 or more times in the past year? No   Trouble with walking or balance? No          5/16/2024   Dental   Dentist two times every year? Yes         5/16/2024   TB Screening   Were you born outside of the [place]? No         Today's PHQ-2 Score:       5/16/2024    10:12 PM   PHQ-2 ( 1999 [employer])   Q1: Little interest or pleasure in doing things 0   Q2: Feeling down, depressed or hopeless 0   PHQ-2 Score 0   Q1: Little interest or pleasure in doing things Not at all   Q2:  Feeling down, depressed or hopeless Not at all   PHQ-2 Score 0           5/16/2024   Substance Use   Alcohol more than 3/day or more than 7/wk No   Do you use any other substances recreationally? No     Social History     Tobacco Use    Smoking status: Never    Smokeless tobacco: Never    Tobacco comments:     no smokers in household   Vaping Use    Vaping status: Never Used   Substance Use Topics    Alcohol use: Yes     Comment: occasional    Drug use: No           5/12/2023   LAST FHS-7 RESULTS   1st degree relative breast or ovarian cancer No   Any relative bilateral breast cancer No   Any male have breast cancer No   Any ONE woman have BOTH breast AND ovarian cancer No   Any woman with breast cancer before 50yrs Yes   2 or more relatives with breast AND/OR ovarian cancer No   2 or more relatives with breast AND/OR bowel cancer No        Mammogram Screening - Annual screen due to history of breast cancer, carcinoma in situ, or hyperplasia        5/16/2024   STI Screening   New sexual partner(s) since last STI/HIV test? No     History of abnormal Pap smear: No - age 30- 64 PAP with HPV every 5 years recommended        Latest Ref Rng & Units 12/3/2019     2:28 PM 12/3/2019     2:25 PM 11/18/2015    11:00 AM   PAP / HPV   PAP (Historical)  NIL      HPV 16 DNA NEG^Negative  Negative  Negative    HPV 18 DNA NEG^Negative  Negative  Negative    Other HR HPV NEG^Negative  Negative  Negative      ASCVD Risk   The 10-year ASCVD risk score (Magaly MANZO, et al., 2019) is: 4.2%    Values used to calculate the score:      Age: 64 years      Sex: Female      Is Non- : No      Diabetic: No      Tobacco smoker: No      Systolic Blood Pressure: 120 mmHg      Is BP treated: No      HDL Cholesterol: 60 mg/dL      Total Cholesterol: 199 mg/dL         Reviewed and updated as needed this visit by Provider   Tobacco  Allergies  Meds  Problems  Med Hx  Surg Hx  Fam Hx               Objective   "  Exam  /72 (BP Location: Right arm, Patient Position: Sitting, Cuff Size: Adult Regular)   Pulse 95   Temp 97.4  F (36.3  C) (Temporal)   Resp 20   Ht 1.565 m (5' 1.61\")   Wt 64.4 kg (142 lb)   LMP 06/02/2007   SpO2 95%   BMI 26.30 kg/m     Estimated body mass index is 26.3 kg/m  as calculated from the following:    Height as of this encounter: 1.565 m (5' 1.61\").    Weight as of this encounter: 64.4 kg (142 lb).    Physical Exam  Constitutional:       General: She is not in acute distress.     Appearance: She is well-developed.   HENT:      Right Ear: Tympanic membrane and external ear normal.      Left Ear: Tympanic membrane and external ear normal.      Nose: Nose normal.   Eyes:      General:         Right eye: No discharge.         Left eye: No discharge.      Conjunctiva/sclera: Conjunctivae normal.   Neck:      Thyroid: No thyroid mass.   Cardiovascular:      Rate and Rhythm: Normal rate and regular rhythm.      Heart sounds: Normal heart sounds, S1 normal and S2 normal. No murmur heard.  Pulmonary:      Effort: Pulmonary effort is normal. No respiratory distress.      Breath sounds: Normal breath sounds. No wheezing or rales.   Abdominal:      General: Bowel sounds are normal.      Palpations: Abdomen is soft. There is no mass.      Tenderness: There is no abdominal tenderness.   Musculoskeletal:         General: Normal range of motion.      Cervical back: Neck supple.   Lymphadenopathy:      Cervical: No cervical adenopathy.   Skin:     General: Skin is warm and dry.      Findings: No rash.   Neurological:      Mental Status: She is alert and oriented to person, place, and time.   Psychiatric:         Mood and Affect: Mood normal.         Behavior: Behavior normal.         Thought Content: Thought content normal.         Judgment: Judgment normal.         Signed Electronically by: Sowmya Gao MD    "

## 2024-05-17 NOTE — PATIENT INSTRUCTIONS
"Preventive Care Advice   This is general advice we often give to help people stay healthy. Your care team may have specific advice just for you. Please talk to your care team about your own preventive care needs.  Lifestyle  Exercise at least 150 minutes each week (30 minutes a day, 5 days a week).  Do muscle strengthening activities 2 days a week. These help control your weight and prevent disease.  No smoking.  Wear sunscreen to prevent skin cancer.  Have your home tested for radon every 2 to 5 years. Radon is a colorless, odorless gas that can harm your lungs. To learn more, go to www.health.[facility]. and search for \"Radon in Homes.\"  Keep guns unloaded and locked up in a safe place like a safe or gun vault, or, use a gun lock and hide the keys. Always lock away bullets separately. To learn more, visit [employer].gov and search for \"safe gun storage.\"  Nutrition  Eat 5 or more servings of fruits and vegetables each day.  Try wheat bread, brown rice and whole grain pasta (instead of white bread, rice, and pasta).  Get enough calcium and vitamin D. Check the label on foods and aim for 100% of the RDA (recommended daily allowance).  Regular exams  Have a dental exam and cleaning every 6 months.  See your health care team every year to talk about:  Any changes in your health.  Any medicines your care team has prescribed.  Preventive care, family planning, and ways to prevent chronic diseases.  Shots (vaccines)   HPV shots (up to age 26), if you've never had them before.  Hepatitis B shots (up to age 59), if you've never had them before.  COVID-19 shot: Get this shot when it's due.  Flu shot: Get a flu shot every year.  Tetanus shot: Get a tetanus shot every 10 years.  Pneumococcal, hepatitis A, and RSV shots: Ask your care team if you need these based on your risk.  Shingles shot (for age 50 and up).  General health tests  Diabetes screening:  Starting at age 35, Get screened for diabetes at least every 3 years.  If " you are younger than age 35, ask your care team if you should be screened for diabetes.  Cholesterol test: At age 39, start having a cholesterol test every 5 years, or more often if advised.  Bone density scan (DEXA): At age 50, ask your care team if you should have this scan for osteoporosis (brittle bones).  Hepatitis C: Get tested at least once in your life.  Abdominal aortic aneurysm screening: Talk to your doctor about having this screening if you:  Have ever smoked; and  Are biologically male; and  Are between the ages of 65 and 75.  STIs (sexually transmitted infections)  Before age 24: Ask your care team if you should be screened for STIs.  After age 24: Get screened for STIs if you're at risk. You are at risk for STIs (including HIV) if:  You are sexually active with more than one person.  You don't use condoms every time.  You or a partner was diagnosed with a sexually transmitted infection.  If you are at risk for HIV, ask about PrEP medicine to prevent HIV.  Get tested for HIV at least once in your life, whether you are at risk for HIV or not.  Cancer screening tests  Cervical cancer screening: If you have a cervix, begin getting regular cervical cancer screening tests at age 21. Most people who have regular screenings with normal results can stop after age 65. Talk about this with your provider.  Breast cancer scan (mammogram): If you've ever had breasts, begin having regular mammograms starting at age 40. This is a scan to check for breast cancer.  Colon cancer screening: It is important to start screening for colon cancer at age 45.  Have a colonoscopy test every 10 years (or more often if you're at risk) Or, ask your provider about stool tests like a FIT test every year or Cologuard test every 3 years.  To learn more about your testing options, visit: www.[employer]/076840.pdf.  For help making a decision, visit: casandra/ut44799.  Prostate cancer screening test: If you have a prostate and are age 55  to 69, ask your provider if you would benefit from a yearly prostate cancer screening test.  Lung cancer screening: If you are a current or former smoker age 50 to 80, ask your care team if ongoing lung cancer screenings are right for you.  For informational purposes only. Not to replace the advice of your health care provider. Copyright   2023 [employer]. All rights reserved. Clinically reviewed by the [facility] Transitions Program. Intentio [id number] - REV 04/24.     Normal for race

## 2024-05-18 LAB — VIT D+METAB SERPL-MCNC: 72 NG/ML (ref 20–50)

## 2024-05-20 ENCOUNTER — MYC MEDICAL ADVICE (OUTPATIENT)
Dept: FAMILY MEDICINE | Facility: OTHER | Age: 64
End: 2024-05-20
Payer: COMMERCIAL

## 2024-05-20 DIAGNOSIS — E67.3 VITAMIN D INTOXICATION: Primary | ICD-10-CM

## 2024-05-25 ENCOUNTER — HEALTH MAINTENANCE LETTER (OUTPATIENT)
Age: 64
End: 2024-05-25

## 2024-06-25 ENCOUNTER — TRANSFERRED RECORDS (OUTPATIENT)
Dept: HEALTH INFORMATION MANAGEMENT | Facility: CLINIC | Age: 64
End: 2024-06-25
Payer: COMMERCIAL

## 2025-04-17 ENCOUNTER — PATIENT OUTREACH (OUTPATIENT)
Dept: CARE COORDINATION | Facility: CLINIC | Age: 65
End: 2025-04-17
Payer: COMMERCIAL

## 2025-05-01 ENCOUNTER — PATIENT OUTREACH (OUTPATIENT)
Dept: CARE COORDINATION | Facility: CLINIC | Age: 65
End: 2025-05-01
Payer: COMMERCIAL

## 2025-05-19 SDOH — HEALTH STABILITY: PHYSICAL HEALTH: ON AVERAGE, HOW MANY DAYS PER WEEK DO YOU ENGAGE IN MODERATE TO STRENUOUS EXERCISE (LIKE A BRISK WALK)?: 2 DAYS

## 2025-05-19 SDOH — HEALTH STABILITY: PHYSICAL HEALTH: ON AVERAGE, HOW MANY MINUTES DO YOU ENGAGE IN EXERCISE AT THIS LEVEL?: 30 MIN

## 2025-05-19 ASSESSMENT — SOCIAL DETERMINANTS OF HEALTH (SDOH): HOW OFTEN DO YOU GET TOGETHER WITH FRIENDS OR RELATIVES?: ONCE A WEEK

## 2025-05-20 ENCOUNTER — RESULTS FOLLOW-UP (OUTPATIENT)
Dept: FAMILY MEDICINE | Facility: OTHER | Age: 65
End: 2025-05-20

## 2025-05-20 ENCOUNTER — ANCILLARY PROCEDURE (OUTPATIENT)
Dept: GENERAL RADIOLOGY | Facility: OTHER | Age: 65
End: 2025-05-20
Attending: FAMILY MEDICINE
Payer: COMMERCIAL

## 2025-05-20 ENCOUNTER — OFFICE VISIT (OUTPATIENT)
Dept: FAMILY MEDICINE | Facility: OTHER | Age: 65
End: 2025-05-20
Payer: COMMERCIAL

## 2025-05-20 VITALS
RESPIRATION RATE: 16 BRPM | WEIGHT: 146 LBS | BODY MASS INDEX: 27.56 KG/M2 | DIASTOLIC BLOOD PRESSURE: 80 MMHG | SYSTOLIC BLOOD PRESSURE: 134 MMHG | OXYGEN SATURATION: 96 % | TEMPERATURE: 97 F | HEART RATE: 73 BPM | HEIGHT: 61 IN

## 2025-05-20 DIAGNOSIS — N95.1 MENOPAUSAL SYNDROME (HOT FLASHES): ICD-10-CM

## 2025-05-20 DIAGNOSIS — R07.89 ATYPICAL CHEST PAIN: ICD-10-CM

## 2025-05-20 DIAGNOSIS — M85.80 OSTEOPENIA, UNSPECIFIED LOCATION: ICD-10-CM

## 2025-05-20 DIAGNOSIS — Z00.00 ROUTINE GENERAL MEDICAL EXAMINATION AT A HEALTH CARE FACILITY: Primary | ICD-10-CM

## 2025-05-20 LAB
D DIMER PPP FEU-MCNC: 0.29 UG/ML FEU (ref 0–0.5)
VIT D+METAB SERPL-MCNC: 48 NG/ML (ref 20–50)

## 2025-05-20 PROCEDURE — 99214 OFFICE O/P EST MOD 30 MIN: CPT | Mod: 25 | Performed by: FAMILY MEDICINE

## 2025-05-20 PROCEDURE — 99397 PER PM REEVAL EST PAT 65+ YR: CPT | Performed by: FAMILY MEDICINE

## 2025-05-20 PROCEDURE — 3075F SYST BP GE 130 - 139MM HG: CPT | Performed by: FAMILY MEDICINE

## 2025-05-20 PROCEDURE — 85379 FIBRIN DEGRADATION QUANT: CPT | Performed by: FAMILY MEDICINE

## 2025-05-20 PROCEDURE — 36415 COLL VENOUS BLD VENIPUNCTURE: CPT | Performed by: FAMILY MEDICINE

## 2025-05-20 PROCEDURE — 82306 VITAMIN D 25 HYDROXY: CPT | Performed by: FAMILY MEDICINE

## 2025-05-20 PROCEDURE — 3079F DIAST BP 80-89 MM HG: CPT | Performed by: FAMILY MEDICINE

## 2025-05-20 PROCEDURE — 71046 X-RAY EXAM CHEST 2 VIEWS: CPT | Mod: TC | Performed by: RADIOLOGY

## 2025-05-20 RX ORDER — VENLAFAXINE HYDROCHLORIDE 37.5 MG/1
37.5 CAPSULE, EXTENDED RELEASE ORAL EVERY OTHER DAY
Qty: 45 CAPSULE | Refills: 3 | Status: SHIPPED | OUTPATIENT
Start: 2025-05-20

## 2025-05-20 NOTE — PROGRESS NOTES
Preventive Care Visit  [facility]  Sowmya Gao MD, Family Medicine  May 20, 2025      Assessment & Plan     Routine general medical examination at a health care facility  Leia Kingston is a 65 year old female with a history of osteopenia, fibromyalgia, and menopausal syndrome who presents today for annual preventative visit with specific concerns as below.  Risks and recommendations were discussed with the patient at this time she declines all vaccinations, including PCV 20, shingles, COVID-19.  Discussed medications and renewed as below.    Osteopenia, unspecified location  The patient has a history of osteopenia. Not taking vitamin D due to high level at last check. Ordered recheck. This is pending. She is taking calcium.  - Vitamin D Deficiency    Menopausal syndrome (hot flashes)  Patient continues to have occasional hot flashes, not significantly changed by decreased dosage of Effexor to every other day. We discussed considering taking every 3 days if she would like, with possibility of discontinuation or going back up to every other day if having increased frequency of hot flashes.  - venlafaxine (EFFEXOR XR) 37.5 MG 24 hr capsule; Take 1 capsule (37.5 mg) by mouth every other day.    Atypical chest pain  The patient's 3 weeks of 15-minute episodes of right-sided, sharp chest pain without shortness of breath are most concerning for musculoskeletal pain versus less likely PE. Obtained D-dimer to evaluate for this, which resulted after the patient left as negative. Also obtained chest XR to evaluate for lung or bony changes. Radiology read pending at this time. D-dimer resulted after the patient left and was within normal limits. Plan is continued watchful waiting and if not resolving in one month she will reach out for a follow-up and we can reevaluate and consider possible CT chest at that time if indicated.  - XR Chest 2 Views; Future  - D dimer,  "quantitative    BMI  Estimated body mass index is 27.59 kg/m  as calculated from the following:    Height as of this encounter: 1.549 m (5' 1\").    Weight as of this encounter: 66.2 kg (146 lb).   Weight management plan: Discussed healthy diet and exercise guidelines    Counseling  Appropriate preventive services were addressed with this patient via screening, questionnaire, or discussion as appropriate for fall prevention, nutrition, physical activity, Tobacco-use cessation, social engagement, weight loss and cognition.  Checklist reviewing preventive services available has been given to the patient.  Reviewed patient's diet, addressing concerns and/or questions.   She is at risk for lack of exercise and has been provided with information to increase physical activity for the benefit of her well-being.   The patient was instructed to see the dentist every 6 months.   I have reviewed Opioid Use Disorder and Substance Use Disorder risk factors and made any needed referrals.     Ashley Dupree is a 65 year old, presenting for the following:  Medicare Visit        5/20/2025     9:40 AM   Additional Questions   Roomed by gabriela         5/20/2025   Forms   Any forms needing to be completed Yes          HPI  Leia Kingston is a 65 year old female with a history of breast cancer who presents today for annual preventative visit.    She states she has been taking venlafaxine every other day since January and has not noticed a change in her symptoms.  She continues to have occasional hot flashes tolerates the medication well.     She typically obtains her mammograms at [facility] and will plan to obtain her next mammogram there as well.    She has a history of osteopenia and had been taking vitamin D in the past however had stopped when her vitamin D level had been high last year on 5/17/2024.  She is interested in rechecking her vitamin D level today.  She is not due for DEXA scan until April " 2026.    She declines vaccines today.    She denies hearing concerns, though a history of cerumen impaction.  She prefers to use OTC treatments such as Debrox at home.      She reports 3 weeks of episodes of right sided chest pain that is sharp.  She states this comes on at rest in the evenings and lasts around 15 minutes.  The pain is described as sharp, originating at the right sternal border and radiates to her right.  She notes that this feels deep to her breast.  She has had no shortness of breath.  No arm numbness.  She denies feeling any lump or swollen lymph nodes.  She states when she lifts her arms the sharp pain improves somewhat.       Advance Care Planning    Discussed advance care planning with patient; informed AVS has link to [facility].        5/19/2025   General Health   How would you rate your overall physical health? Good   Feel stress (tense, anxious, or unable to sleep) Not at all         5/19/2025   Nutrition   Diet: Regular (no restrictions)         5/19/2025   Exercise   Days per week of moderate/strenous exercise 2 days   Average minutes spent exercising at this level 30 min   (!) EXERCISE CONCERN      5/19/2025   Social Factors   Frequency of gathering with friends or relatives Once a week   Worry food won't last until get money to buy more No   Food not last or not have enough money for food? No   Do you have housing? (Housing is defined as stable permanent housing and does not include staying outside in a car, in a tent, in an abandoned building, in an overnight shelter, or couch-surfing.) Yes   Are you worried about losing your housing? No   Lack of transportation? No   Unable to get utilities (heat,electricity)? No         5/19/2025   Fall Risk   Fallen 2 or more times in the past year? No   Trouble with walking or balance? No          5/19/2025   Activities of Daily Living- Home Safety   Needs help with the following daily activites None of the above   Safety concerns in the  home None of the above         5/19/2025   Dental   Dentist two times every year? (!) NO, has dental home         5/19/2025   Hearing Screening   Hearing concerns? None of the above         5/19/2025   Driving Risk Screening   Patient/family members have concerns about driving No         5/19/2025   General Alertness/Fatigue Screening   Have you been more tired than usual lately? No         5/19/2025   Urinary Incontinence Screening   Bothered by leaking urine in past 6 months No         Today's PHQ-2 Score:       5/19/2025    11:53 AM   PHQ-2 ( 1999 [employer])   Q1: Little interest or pleasure in doing things 0   Q2: Feeling down, depressed or hopeless 0   PHQ-2 Score 0    Q1: Little interest or pleasure in doing things Not at all   Q2: Feeling down, depressed or hopeless Not at all   PHQ-2 Score 0       Patient-reported           5/19/2025   Substance Use   Alcohol more than 3/day or more than 7/wk No   Do you have a current opioid prescription? (!) YES   How severe/bad is pain from 1 to 10? 2/10   Do you use any other substances recreationally? No          No data to display              Low Risk (0-3)  Moderate Risk (4-7)  High Risk (>8)  Social History     Tobacco Use    Smoking status: Never    Smokeless tobacco: Never    Tobacco comments:     no smokers in household   Vaping Use    Vaping status: Never Used   Substance Use Topics    Alcohol use: Yes     Comment: occasional    Drug use: No           5/12/2023   LAST FHS-7 RESULTS   1st degree relative breast or ovarian cancer No    Any relative bilateral breast cancer No    Any male have breast cancer No    Any ONE woman have BOTH breast AND ovarian cancer No    Any woman with breast cancer before 50yrs Yes    2 or more relatives with breast AND/OR ovarian cancer No    2 or more relatives with breast AND/OR bowel cancer No        Proxy-reported        Mammogram Screening - Mammogram every 1-2 years updated in Health Maintenance based on mutual decision  making      History of abnormal Pap smear: No - age 65 or older with adequate negative prior screening test results (3 consecutive negative cytology results, 2 consecutive negative cotesting results, or 2 consecutive negative HrHPV test results within 10 years, with the most recent test occurring within the recommended screening interval for the test used)        Latest Ref Rng & Units 12/3/2019     2:28 PM 12/3/2019     2:25 PM 11/18/2015    11:00 AM   PAP / HPV   PAP (Historical)  NIL      HPV 16 DNA NEG^Negative  Negative  Negative    HPV 18 DNA NEG^Negative  Negative  Negative    Other HR HPV NEG^Negative  Negative  Negative      ASCVD Risk   The 10-year ASCVD risk score (Magaly MANZO, et al., 2019) is: 5.8%    Values used to calculate the score:      Age: 65 years      Sex: Female      Is Non- : No      Diabetic: No      Tobacco smoker: No      Systolic Blood Pressure: 134 mmHg      Is BP treated: No      HDL Cholesterol: 60 mg/dL      Total Cholesterol: 199 mg/dL        Reviewed and updated as needed this visit by Provider   Tobacco  Allergies  Meds  Problems  Med Hx  Surg Hx  Fam Hx            Past Medical History:   Diagnosis Date    Malignant neoplasm of breast (female), unspecified site      Current providers sharing in care for this patient include:  Patient Care Team:  Sowmya Gao MD as PCP - General (Family Practice)  Sowmya Gao MD as Assigned PCP  Julio Rodriguez DPM as Assigned Surgical Provider    The following health maintenance items are reviewed in Epic and correct as of today:  Health Maintenance   Topic Date Due    Pneumococcal Vaccine: 50+ Years (1 of 1 - PCV) Never done    ZOSTER IMMUNIZATION (1 of 2) Never done    DEXA  05/17/2024    COVID-19 Vaccine (1 - 2024-25 season) Never done    MEDICARE ANNUAL WELLNESS VISIT  05/17/2025    MAMMO SCREENING  06/25/2025    INFLUENZA VACCINE (Season Ended) 09/01/2025    DTAP/TDAP/TD  "IMMUNIZATION (2 - Td or Tdap) 11/18/2025    ANNUAL REVIEW OF HM ORDERS  05/20/2026    FALL RISK ASSESSMENT  05/20/2026    DIABETES SCREENING  05/17/2027    LIPID  05/12/2028    ADVANCE CARE PLANNING  05/20/2030    COLORECTAL CANCER SCREENING  04/29/2032    RSV VACCINE (1 - 1-dose 75+ series) 03/22/2035    HEPATITIS C SCREENING  Completed    PHQ-2 (once per calendar year)  Completed    HPV IMMUNIZATION  Aged Out    MENINGITIS IMMUNIZATION  Aged Out    HIV SCREENING  Discontinued    PAP  Discontinued        Objective    Exam  /80 (BP Location: Right arm, Patient Position: Sitting, Cuff Size: Adult Regular)   Pulse 73   Temp 97  F (36.1  C) (Temporal)   Resp 16   Ht 1.549 m (5' 1\")   Wt 66.2 kg (146 lb)   LMP 06/02/2007   SpO2 96%   BMI 27.59 kg/m     Estimated body mass index is 27.59 kg/m  as calculated from the following:    Height as of this encounter: 1.549 m (5' 1\").    Weight as of this encounter: 66.2 kg (146 lb).    Physical Exam  Vitals and nursing note reviewed.   Constitutional:       General: She is not in acute distress.     Appearance: She is not toxic-appearing or diaphoretic.   HENT:      Head: Normocephalic and atraumatic.      Right Ear: External ear normal.      Left Ear: External ear normal. There is impacted cerumen.      Ears:      Comments: Right canal appears normal, TM partially visible around impacted cerumen, pearly gray. Left canal with complete occlusion with cerumen.     Mouth/Throat:      Mouth: Mucous membranes are moist.      Pharynx: Oropharynx is clear. No oropharyngeal exudate or posterior oropharyngeal erythema.   Eyes:      General: No scleral icterus.        Right eye: No discharge.         Left eye: No discharge.      Extraocular Movements: Extraocular movements intact.      Conjunctiva/sclera: Conjunctivae normal.      Pupils: Pupils are equal, round, and reactive to light.   Cardiovascular:      Rate and Rhythm: Normal rate and regular rhythm.      Heart sounds: " Normal heart sounds.   Pulmonary:      Effort: Pulmonary effort is normal.      Breath sounds: Normal breath sounds. No wheezing, rhonchi or rales.   Neurological:      Mental Status: She is alert.   Psychiatric:         Mood and Affect: Mood normal.         Behavior: Behavior normal.         Thought Content: Thought content normal.         Judgment: Judgment normal.           5/20/2025   Mini Cog   Clock Draw Score 2 Normal   3 Item Recall 3 objects recalled   Mini Cog Total Score 5         Results for orders placed or performed in visit on 05/20/25   XR Chest 2 Views     Status: None    Narrative    EXAM: XR CHEST 2 VIEWS  LOCATION: [facility]  DATE: 5/20/2025    INDICATION:  Atypical chest pain  COMPARISON: None.      Impression    IMPRESSION: Negative chest.   Results for orders placed or performed in visit on 05/20/25   D dimer, quantitative     Status: Normal   Result Value Ref Range    D-Dimer Quantitative 0.29 0.00 - 0.50 ug/mL FEU    Narrative    This D-dimer assay is intended for use in conjunction with a clinical pretest probability assessment model to exclude pulmonary embolism (PE) and deep venous thrombosis (DVT) in outpatients suspected of PE or DVT. The cut-off value is 0.50 ug/mL FEU.    For patients 50 years of age or older, the application of age-adjusted cut-off values for D-Dimer may increase the specificity without significant effect on sensitivity. The literature suggested calculation age adjusted cut-off in ug/L = age in years x 10 ug/L. The results in this laboratory are reported as ug/mL rather than ug/L. The calculation for age adjusted cut off in ug/mL= age in years x 0.01 ug/mL. For example, the cut off for a 76 year old male is 76 x 0.01 ug/mL = 0.76 ug/mL (760 ug/L).    M Maia et al. Age adjusted D-dimer cut-off levels to rule out pulmonary embolism: The ADJUST-PE Study. CARLI 2014;311:2130-5344.; KONG Harry et al. Diagnostic accuracy of conventional or age  adjusted D-dimer cutoff values in older patients with suspected venous thromboembolism. Systemic review and meta-analysis. BMJ 2013:346:f2492.   Vitamin D Deficiency     Status: Normal   Result Value Ref Range    Vitamin D, Total (25-Hydroxy) 48 20 - 50 ng/mL    Narrative    Season, race, dietary intake, and treatment affect the concentration of 25-hydroxy-Vitamin D. Values may decrease during winter months and increase during summer months.    Vitamin D determination is routinely performed by an immunoassay specific for 25 hydroxyvitamin D3.  If an individual is on vitamin D2(ergocalciferol) supplementation, please specify 25 OH vitamin D2 and D3 level determination by LCMSMS test VITD23.         Patient was seen and examined by myself and Dr. Nagy. The note was then documented by me.    Ayo Obrien, MS4  [employer]  05/20/25    I was present with the medical student who participated in the service and in the documentation of the note. I have verified the history and personally performed the physical exam and medical decision making. I reviewed the note in detail and edited it appropriately. I agree with the assessment and plan of care as documented in the note.    Signed Electronically by: Sowmya Gao MD

## 2025-05-20 NOTE — PATIENT INSTRUCTIONS
Patient Education   Preventive Care Advice   This is general advice given by our system to help you stay healthy. However, your care team may have specific advice just for you. Please talk to your care team about your preventive care needs.  Nutrition  Eat 5 or more servings of fruits and vegetables each day.  Try wheat bread, brown rice and whole grain pasta (instead of white bread, rice, and pasta).  Get enough calcium and vitamin D. Check the label on foods and aim for 100% of the RDA (recommended daily allowance).  Lifestyle  Exercise at least 150 minutes each week  (30 minutes a day, 5 days a week).  Do muscle strengthening activities 2 days a week. These help control your weight and prevent disease.  No smoking.  Wear sunscreen to prevent skin cancer.  Have a dental exam and cleaning every 6 months.  Yearly exams  See your health care team every year to talk about:  Any changes in your health.  Any medicines your care team has prescribed.  Preventive care, family planning, and ways to prevent chronic diseases.  Shots (vaccines)   HPV shots (up to age 26), if you've never had them before.  Hepatitis B shots (up to age 59), if you've never had them before.  COVID-19 shot: Get this shot when it's due.  Flu shot: Get a flu shot every year.  Tetanus shot: Get a tetanus shot every 10 years.  Pneumococcal, hepatitis A, and RSV shots: Ask your care team if you need these based on your risk.  Shingles shot (for age 50 and up)  General health tests  Diabetes screening:  Starting at age 35, Get screened for diabetes at least every 3 years.  If you are younger than age 35, ask your care team if you should be screened for diabetes.  Cholesterol test: At age 39, start having a cholesterol test every 5 years, or more often if advised.  Bone density scan (DEXA): At age 50, ask your care team if you should have this scan for osteoporosis (brittle bones).  Hepatitis C: Get tested at least once in your life.  STIs (sexually  transmitted infections)  Before age 24: Ask your care team if you should be screened for STIs.  After age 24: Get screened for STIs if you're at risk. You are at risk for STIs (including HIV) if:  You are sexually active with more than one person.  You don't use condoms every time.  You or a partner was diagnosed with a sexually transmitted infection.  If you are at risk for HIV, ask about PrEP medicine to prevent HIV.  Get tested for HIV at least once in your life, whether you are at risk for HIV or not.  Cancer screening tests  Cervical cancer screening: If you have a cervix, begin getting regular cervical cancer screening tests starting at age 21.  Breast cancer scan (mammogram): If you've ever had breasts, begin having regular mammograms starting at age 40. This is a scan to check for breast cancer.  Colon cancer screening: It is important to start screening for colon cancer at age 45.  Have a colonoscopy test every 10 years (or more often if you're at risk) Or, ask your provider about stool tests like a FIT test every year or Cologuard test every 3 years.  To learn more about your testing options, visit:   .  For help making a decision, visit:   https://bit.ly/da34725.  Prostate cancer screening test: If you have a prostate, ask your care team if a prostate cancer screening test (PSA) at age 55 is right for you.  Lung cancer screening: If you are a current or former smoker ages 50 to 80, ask your care team if ongoing lung cancer screenings are right for you.  For informational purposes only. Not to replace the advice of your health care provider. Copyright   2023 [facility]. All rights reserved. Clinically reviewed by the [facility] Transitions Program. TAPP 201945 - REV 01/24.  Chronic Pain: Care Instructions  Your Care Instructions     Chronic pain is pain that lasts a long time (months or even years) and may or may not have a clear cause. It is different from acute pain, which  usually does have a clear cause--like an injury or illness--and gets better over time. Chronic pain:  Lasts over time but may vary from day to day.  Does not go away despite efforts to end it.  May disrupt your sleep and lead to fatigue.  May cause depression or anxiety.  May make your muscles tense, causing more pain.  Can disrupt your work, hobbies, home life, and relationships with friends and family.  Chronic pain is a very real condition. It is not just in your head. Treatment can help and usually includes several methods used together, such as medicines, physical therapy, exercise, and other treatments. Learning how to relax and changing negative thought patterns can also help you cope.  Chronic pain is complex. Taking an active role in your treatment will help you better manage your pain. Tell your doctor if you have trouble dealing with your pain. You may have to try several things before you find what works best for you.  Follow-up care is a key part of your treatment and safety. Be sure to make and go to all appointments, and call your doctor if you are having problems. It's also a good idea to know your test results and keep a list of the medicines you take.  How can you care for yourself at home?  Pace yourself. Break up large jobs into smaller tasks. Save harder tasks for days when you have less pain, or go back and forth between hard tasks and easier ones. Take rest breaks.  Relax, and reduce stress. Relaxation techniques such as deep breathing or meditation can help.  Keep moving. Gentle, daily exercise can help reduce pain over the long run. Try low- or no-impact exercises such as walking, swimming, and stationary biking. Do stretches to stay flexible.  Try heat, cold packs, and massage.  Get enough sleep. Chronic pain can make you tired and drain your energy. Talk with your doctor if you have trouble sleeping because of pain.  Think positive. Your thoughts can affect your pain level. Do things that  you enjoy to distract yourself when you have pain instead of focusing on the pain. See a movie, read a book, listen to music, or spend time with a friend.  If you think you are depressed, talk to your doctor about treatment.  Keep a daily pain diary. Record how your moods, thoughts, sleep patterns, activities, and medicine affect your pain. You may find that your pain is worse during or after certain activities or when you are feeling a certain emotion. Having a record of your pain can help you and your doctor find the best ways to treat your pain.  Take pain medicines exactly as directed.  If the doctor gave you a prescription medicine for pain, take it as prescribed.  If you are not taking a prescription pain medicine, ask your doctor if you can take an over-the-counter medicine.  Reducing constipation caused by pain medicine  Talk to your doctor about a laxative. If a laxative doesn't work, your doctor may suggest a prescription medicine.  Include fruits, vegetables, beans, and whole grains in your diet each day. These foods are high in fiber.  If your doctor recommends it, get more exercise. Walking is a good choice. Bit by bit, increase the amount you walk every day. Try for at least 30 minutes on most days of the week.  Schedule time each day for a bowel movement. A daily routine may help. Take your time and do not strain when having a bowel movement.  When should you call for help?   Call your doctor now or seek immediate medical care if:    Your pain gets worse or is out of control.     You feel down or blue, or you do not enjoy things like you once did. You may be depressed, which is common in people with chronic pain. Depression can be treated.     You have vomiting or cramps for more than 2 hours.   Watch closely for changes in your health, and be sure to contact your doctor if:    You cannot sleep because of pain.     You are very worried or anxious about your pain.     You have trouble taking your pain  "medicine.     You have any concerns about your pain medicine.     You have trouble with bowel movements, such as:  No bowel movement in 3 days.  Blood in the anal area, in your stool, or on the toilet paper.  Diarrhea for more than 24 hours.   Where can you learn more?  Go to https://www.[employer].net/patiented  Enter N004 in the search box to learn more about \"Chronic Pain: Care Instructions.\"  Current as of: July 31, 2024  Content Version: 14.4    3521-0269 [employer].   Care instructions adapted under license by your healthcare professional. If you have questions about a medical condition or this instruction, always ask your healthcare professional. [employer] disclaims any warranty or liability for your use of this information.       "

## 2025-05-26 ENCOUNTER — PATIENT OUTREACH (OUTPATIENT)
Dept: CARE COORDINATION | Facility: CLINIC | Age: 65
End: 2025-05-26
Payer: COMMERCIAL

## 2025-06-22 ENCOUNTER — MYC MEDICAL ADVICE (OUTPATIENT)
Dept: FAMILY MEDICINE | Facility: OTHER | Age: 65
End: 2025-06-22
Payer: COMMERCIAL

## 2025-06-23 ENCOUNTER — PATIENT OUTREACH (OUTPATIENT)
Dept: CARE COORDINATION | Facility: CLINIC | Age: 65
End: 2025-06-23
Payer: COMMERCIAL

## 2025-06-23 NOTE — TELEPHONE ENCOUNTER
"Patient seen in office 5/20/25 where atypical chest pain was worked up. Per office note \"Plan is continued watchful waiting and if not resolving in one month she will reach out for a follow-up and we can reevaluate and consider possible CT chest at that time if indicated.\"    Called and spoke with patient. Pain is not worse, states not as sharp, more dull/achey but feels like it is more frequent. No SOB. Scheduled in clinic appointment for Wednesday for follow up with PCP. Reviewed red flags and use ED for red flags. Patient verbalizes and agrees with plan.     Malena Fagan RN on 6/23/2025 at 2:33 PM    "

## 2025-06-25 ENCOUNTER — OFFICE VISIT (OUTPATIENT)
Dept: FAMILY MEDICINE | Facility: OTHER | Age: 65
End: 2025-06-25
Payer: COMMERCIAL

## 2025-06-25 VITALS
TEMPERATURE: 96.8 F | HEART RATE: 74 BPM | OXYGEN SATURATION: 99 % | BODY MASS INDEX: 27.38 KG/M2 | RESPIRATION RATE: 16 BRPM | WEIGHT: 145 LBS | HEIGHT: 61 IN | SYSTOLIC BLOOD PRESSURE: 136 MMHG | DIASTOLIC BLOOD PRESSURE: 82 MMHG

## 2025-06-25 DIAGNOSIS — R07.89 ATYPICAL CHEST PAIN: Primary | ICD-10-CM

## 2025-06-25 LAB
CHOLEST SERPL-MCNC: 210 MG/DL
ERYTHROCYTE [DISTWIDTH] IN BLOOD BY AUTOMATED COUNT: 12.1 % (ref 10–15)
FASTING STATUS PATIENT QL REPORTED: YES
HCT VFR BLD AUTO: 43.6 % (ref 35–47)
HDLC SERPL-MCNC: 58 MG/DL
HGB BLD-MCNC: 14.9 G/DL (ref 11.7–15.7)
LDLC SERPL CALC-MCNC: 119 MG/DL
MCH RBC QN AUTO: 30.9 PG (ref 26.5–33)
MCHC RBC AUTO-ENTMCNC: 34.2 G/DL (ref 31.5–36.5)
MCV RBC AUTO: 91 FL (ref 78–100)
NONHDLC SERPL-MCNC: 152 MG/DL
PLATELET # BLD AUTO: 188 10E3/UL (ref 150–450)
RBC # BLD AUTO: 4.82 10E6/UL (ref 3.8–5.2)
TRIGL SERPL-MCNC: 164 MG/DL
TROPONIN T SERPL HS-MCNC: 11 NG/L
TSH SERPL DL<=0.005 MIU/L-ACNC: 1.23 UIU/ML (ref 0.3–4.2)
WBC # BLD AUTO: 5.8 10E3/UL (ref 4–11)

## 2025-06-25 PROCEDURE — 3079F DIAST BP 80-89 MM HG: CPT | Performed by: FAMILY MEDICINE

## 2025-06-25 PROCEDURE — 3075F SYST BP GE 130 - 139MM HG: CPT | Performed by: FAMILY MEDICINE

## 2025-06-25 PROCEDURE — 84484 ASSAY OF TROPONIN QUANT: CPT | Performed by: FAMILY MEDICINE

## 2025-06-25 PROCEDURE — 84443 ASSAY THYROID STIM HORMONE: CPT | Performed by: FAMILY MEDICINE

## 2025-06-25 PROCEDURE — 36415 COLL VENOUS BLD VENIPUNCTURE: CPT | Performed by: FAMILY MEDICINE

## 2025-06-25 PROCEDURE — 85027 COMPLETE CBC AUTOMATED: CPT | Performed by: FAMILY MEDICINE

## 2025-06-25 PROCEDURE — 93000 ELECTROCARDIOGRAM COMPLETE: CPT | Performed by: FAMILY MEDICINE

## 2025-06-25 PROCEDURE — 80061 LIPID PANEL: CPT | Performed by: FAMILY MEDICINE

## 2025-06-25 PROCEDURE — 99214 OFFICE O/P EST MOD 30 MIN: CPT | Performed by: FAMILY MEDICINE

## 2025-06-25 NOTE — PROGRESS NOTES
"  Assessment & Plan     Atypical chest pain  Patient has had 2 months of right-sided atypical chest pain that is happening more frequently.  It is not associated with activity or food.  Pain is changing where it started out as sharp and is now more of a dull and constant pain which lasts 15 minutes at a time.  Differential includes cardiac, pulmonary, musculoskeletal, esophageal.  At this time we will obtain labs.  Her EKG was reassuring.  Will obtain stress test and chest CT. if all studies are negative may need to consider PFTs or EGD.    - EKG 12-lead complete w/read - Clinics  - Echocardiogram Exercise Stress; Future  - CT Chest w Contrast; Future  - Troponin T, High Sensitivity  - Lipid panel reflex to direct LDL Fasting  - TSH with free T4 reflex  - CBC with platelets      Subjective   Leia is a 65 year old, presenting for the following health issues:  RECHECK (Chest pain)        6/25/2025    11:13 AM   Additional Questions   Roomed by gabriela     History of Present Illness       Reason for visit:  Follow up chest pain    She eats 2-3 servings of fruits and vegetables daily.She consumes 1 sweetened beverage(s) daily.She exercises with enough effort to increase her heart rate 10 to 19 minutes per day.  She exercises with enough effort to increase her heart rate 3 or less days per week.   She is taking medications regularly.      Patient seen in office 5/20/25 where atypical chest pain was worked up. Per office note \"Plan is continued watchful waiting and if not resolving in one month she will reach out for a follow-up and we can reevaluate and consider possible CT chest at that time if indicated.\"     Called and spoke with patient. Pain is not worse, states not as sharp, more dull/achey but feels like it is more frequent. No SOB. Scheduled in clinic appointment for Wednesday for follow up with PCP. Reviewed red flags and use ED for red flags. Patient verbalizes and agrees with plan.      Malena Fagan, " "RN on 6/23/2025 at 2:33 PM     Patient was seen for a physical a month ago.  At that time she complained of 3 weeks of intermittent sharp right sided chest pain that resolved fairly quickly.  However over this last month she now states that this pain has changed.  She states it happens more often.  She states it started happening 3-4 times a week and now its almost every day.  The pain is still on the right side but feels more like a pressure.  Sometimes she feels this goes through the back of her chest.  She denies shortness of breath.  She denies that it is changed any activity.  She denies that activity brings it on.  She denies that food brings it on.  She denies heartburn.  She has fibromyalgia but this pain feels different than that.      Objective    /82 (BP Location: Right arm, Patient Position: Sitting, Cuff Size: Adult Regular)   Pulse 74   Temp 96.8  F (36  C) (Temporal)   Resp 16   Ht 1.549 m (5' 1\")   Wt 65.8 kg (145 lb)   LMP 06/02/2007   SpO2 99%   BMI 27.40 kg/m    Body mass index is 27.4 kg/m .  Physical Exam   Gen: no apparent distress  NECK: no adenopathy, no asymmetry, no masses  Chest: clear to auscultation without wheeze, rale or rhonchi, no chest wall tenderness   Cor: regular rate and rhythm without murmur  Ext: warm and dry without edema  Psych: Alert and oriented times 3; coherent speech, normal   rate and volume, able to articulate logical thoughts, able   to abstract reason, no tangential thoughts, no hallucinations   or delusions  Her affect is neutral    EKG:  normal sinus rhythm         Signed Electronically by: Sowmya Gao MD    "

## 2025-06-26 ENCOUNTER — RESULTS FOLLOW-UP (OUTPATIENT)
Dept: FAMILY MEDICINE | Facility: OTHER | Age: 65
End: 2025-06-26
Payer: COMMERCIAL

## 2025-07-02 ENCOUNTER — TELEPHONE (OUTPATIENT)
Dept: FAMILY MEDICINE | Facility: OTHER | Age: 65
End: 2025-07-02

## 2025-07-02 ENCOUNTER — HOSPITAL ENCOUNTER (OUTPATIENT)
Dept: CARDIOLOGY | Facility: CLINIC | Age: 65
Discharge: HOME | End: 2025-07-02
Attending: FAMILY MEDICINE
Payer: COMMERCIAL

## 2025-07-02 ENCOUNTER — HOSPITAL ENCOUNTER (OUTPATIENT)
Dept: CT IMAGING | Facility: CLINIC | Age: 65
Discharge: HOME | End: 2025-07-02
Attending: FAMILY MEDICINE
Payer: COMMERCIAL

## 2025-07-02 DIAGNOSIS — R07.89 ATYPICAL CHEST PAIN: ICD-10-CM

## 2025-07-02 DIAGNOSIS — I47.10 SVT (SUPRAVENTRICULAR TACHYCARDIA): ICD-10-CM

## 2025-07-02 DIAGNOSIS — R94.39 ABNORMAL STRESS TEST: Primary | ICD-10-CM

## 2025-07-02 PROCEDURE — 255N000002 HC RX 255 OP 636: Performed by: INTERNAL MEDICINE

## 2025-07-02 PROCEDURE — 71260 CT THORAX DX C+: CPT

## 2025-07-02 PROCEDURE — 93325 DOPPLER ECHO COLOR FLOW MAPG: CPT | Mod: TC

## 2025-07-02 PROCEDURE — 93016 CV STRESS TEST SUPVJ ONLY: CPT

## 2025-07-02 PROCEDURE — 250N000011 HC RX IP 250 OP 636: Performed by: FAMILY MEDICINE

## 2025-07-02 PROCEDURE — 93016 CV STRESS TEST SUPVJ ONLY: CPT | Performed by: INTERNAL MEDICINE

## 2025-07-02 PROCEDURE — 250N000009 HC RX 250: Performed by: FAMILY MEDICINE

## 2025-07-02 RX ORDER — IOPAMIDOL 755 MG/ML
500 INJECTION, SOLUTION INTRAVASCULAR ONCE
Status: COMPLETED | OUTPATIENT
Start: 2025-07-02 | End: 2025-07-02

## 2025-07-02 RX ADMIN — PERFLUTREN 4 ML (DILUTED): 6.52 INJECTION, SUSPENSION INTRAVENOUS at 13:51

## 2025-07-02 RX ADMIN — IOPAMIDOL 71 ML: 755 INJECTION, SOLUTION INTRAVENOUS at 15:03

## 2025-07-02 RX ADMIN — SODIUM CHLORIDE 60 ML: 9 INJECTION, SOLUTION INTRAVENOUS at 15:02

## 2025-07-02 NOTE — TELEPHONE ENCOUNTER
RN Triage    Patient Contact    Attempt # 1    RN did attempt to reach patient . No answer. Message left for patient  to call the clinic back and ask to speak to a member of the care team. Wanting to triage if patient is having cardiac symptoms.  See message from provider below.      Fiorella Montano RN on 7/2/2025 at 4:18 PM

## 2025-07-02 NOTE — TELEPHONE ENCOUNTER
Dr. Mead is calling from [facility]:    Abnormal results- please review results and make sure patient does not have symptoms.    Lorna Comer RN on 7/2/2025 at 3:36 PM

## 2025-07-03 ENCOUNTER — OFFICE VISIT (OUTPATIENT)
Dept: CARDIOLOGY | Facility: CLINIC | Age: 65
End: 2025-07-03
Attending: FAMILY MEDICINE
Payer: COMMERCIAL

## 2025-07-03 VITALS
BODY MASS INDEX: 27.27 KG/M2 | WEIGHT: 144.3 LBS | SYSTOLIC BLOOD PRESSURE: 153 MMHG | OXYGEN SATURATION: 97 % | HEART RATE: 85 BPM | DIASTOLIC BLOOD PRESSURE: 95 MMHG

## 2025-07-03 DIAGNOSIS — I47.10 SVT (SUPRAVENTRICULAR TACHYCARDIA): ICD-10-CM

## 2025-07-03 DIAGNOSIS — Z82.3 FAMILY HISTORY OF STROKE: ICD-10-CM

## 2025-07-03 DIAGNOSIS — E78.01 FAMILIAL HYPERCHOLESTEROLEMIA: ICD-10-CM

## 2025-07-03 DIAGNOSIS — Z82.49 FAMILY HISTORY OF PREMATURE CORONARY HEART DISEASE: ICD-10-CM

## 2025-07-03 DIAGNOSIS — R07.89 ATYPICAL CHEST PAIN: ICD-10-CM

## 2025-07-03 DIAGNOSIS — R94.39 ABNORMAL STRESS TEST: Primary | ICD-10-CM

## 2025-07-03 PROCEDURE — 3080F DIAST BP >= 90 MM HG: CPT | Performed by: INTERNAL MEDICINE

## 2025-07-03 PROCEDURE — 3077F SYST BP >= 140 MM HG: CPT | Performed by: INTERNAL MEDICINE

## 2025-07-03 PROCEDURE — 99213 OFFICE O/P EST LOW 20 MIN: CPT | Performed by: INTERNAL MEDICINE

## 2025-07-03 PROCEDURE — 99204 OFFICE O/P NEW MOD 45 MIN: CPT | Mod: GC | Performed by: INTERNAL MEDICINE

## 2025-07-03 PROCEDURE — 93005 ELECTROCARDIOGRAM TRACING: CPT

## 2025-07-03 RX ORDER — ASPIRIN 325 MG
325 TABLET ORAL ONCE
OUTPATIENT
Start: 2025-07-03 | End: 2025-07-03

## 2025-07-03 RX ORDER — ASPIRIN 81 MG/1
81 TABLET ORAL DAILY
Qty: 90 TABLET | Refills: 3 | Status: SHIPPED | OUTPATIENT
Start: 2025-07-03 | End: 2025-07-03

## 2025-07-03 RX ORDER — POTASSIUM CHLORIDE 1500 MG/1
40 TABLET, EXTENDED RELEASE ORAL
OUTPATIENT
Start: 2025-07-03

## 2025-07-03 RX ORDER — NITROGLYCERIN 0.4 MG/1
TABLET SUBLINGUAL
Qty: 25 TABLET | Refills: 3 | Status: SHIPPED | OUTPATIENT
Start: 2025-07-03

## 2025-07-03 RX ORDER — ASPIRIN 81 MG/1
81 TABLET ORAL DAILY
Qty: 90 TABLET | Refills: 3 | Status: SHIPPED | OUTPATIENT
Start: 2025-07-03

## 2025-07-03 RX ORDER — LIDOCAINE 40 MG/G
CREAM TOPICAL
OUTPATIENT
Start: 2025-07-03

## 2025-07-03 RX ORDER — NITROGLYCERIN 0.4 MG/1
TABLET SUBLINGUAL
Qty: 25 TABLET | Refills: 3 | Status: SHIPPED | OUTPATIENT
Start: 2025-07-03 | End: 2025-07-03

## 2025-07-03 RX ORDER — SODIUM CHLORIDE 9 MG/ML
INJECTION, SOLUTION INTRAVENOUS CONTINUOUS
OUTPATIENT
Start: 2025-07-03

## 2025-07-03 RX ORDER — LISINOPRIL 10 MG/1
10 TABLET ORAL DAILY
Qty: 90 TABLET | Refills: 3 | Status: SHIPPED | OUTPATIENT
Start: 2025-07-03 | End: 2025-07-03

## 2025-07-03 RX ORDER — AMLODIPINE BESYLATE 5 MG/1
5 TABLET ORAL AT BEDTIME
Qty: 90 TABLET | Refills: 3 | Status: SHIPPED | OUTPATIENT
Start: 2025-07-03 | End: 2025-07-03

## 2025-07-03 RX ORDER — DIMENHYDRINATE 50 MG
1 TABLET ORAL DAILY
COMMUNITY

## 2025-07-03 RX ORDER — LISINOPRIL 10 MG/1
10 TABLET ORAL DAILY
Qty: 90 TABLET | Refills: 3 | Status: SHIPPED | OUTPATIENT
Start: 2025-07-03

## 2025-07-03 RX ORDER — POTASSIUM CHLORIDE 1500 MG/1
20 TABLET, EXTENDED RELEASE ORAL
OUTPATIENT
Start: 2025-07-03

## 2025-07-03 RX ORDER — ROSUVASTATIN CALCIUM 20 MG/1
20 TABLET, COATED ORAL DAILY
Qty: 90 TABLET | Refills: 3 | Status: SHIPPED | OUTPATIENT
Start: 2025-07-03 | End: 2025-07-03

## 2025-07-03 RX ORDER — ASPIRIN 81 MG/1
243 TABLET, CHEWABLE ORAL ONCE
OUTPATIENT
Start: 2025-07-03

## 2025-07-03 RX ORDER — AMLODIPINE BESYLATE 5 MG/1
5 TABLET ORAL AT BEDTIME
Qty: 90 TABLET | Refills: 3 | Status: SHIPPED | OUTPATIENT
Start: 2025-07-03

## 2025-07-03 RX ORDER — ROSUVASTATIN CALCIUM 20 MG/1
20 TABLET, COATED ORAL DAILY
Qty: 90 TABLET | Refills: 3 | Status: SHIPPED | OUTPATIENT
Start: 2025-07-03

## 2025-07-03 NOTE — TELEPHONE ENCOUNTER
RN Triage    Patient Contact    Attempt # 1    Was call answered?  No.  Left message on voicemail with information to call me back. Sent [place] message.    Upon call back please relay message below, triage if needed. Cards referral number [phone], Priority 3-5 days.    Lorin Saldivar RN  [facility] - Registered Nurse  Clinic Triage [facility]   July 3, 2025

## 2025-07-03 NOTE — PROGRESS NOTES
HPI:   I had the privilege to evaluate and examine Mrs Leia Kingston, who is a 65 yr female past with a past medical history of breast cancer (triple negative) status post radiation and chemotherapy, osteopenia, and history of fibromyalgia who comes to clinic today for urgent cardiology follow-up with abnormal stress yesterday.    On interview, patient states that she has had episodes of chest pain since May 2025.  Initially these were sharp pain on her right chest.  This is nonpositional.  Has lasted for 10 to 15 minutes and occurs about 2-3 times a week in the evening.  Initial testing that was performed was targeted more towards pulmonary emboli but was negative.  She was told to follow-up.      More recently she has noted that this pain has changed to a dull like pressure on the right side and traveling to the back.  States this is more consistent and is not correlated with exercise or rest.  Unable to get this to stop based on position as well.  States that she has not had symptoms like this before.    At her PCP: EKG was performed showing sinus rhythm with lead III Q waves.  Additionally, had stress echo performed yesterday that demonstrated mid to distal anterior, apical, and anterior septal inducible ischemia.  She was unable to tolerate much exertion      PAST MEDICAL HISTORY:  Past Medical History:   Diagnosis Date    Malignant neoplasm of breast (female), unspecified site        CURRENT MEDICATIONS:  Current Outpatient Medications   Medication Sig Dispense Refill    aspirin 81 MG chewable tablet Take 1 tablet (81 mg) by mouth daily 108 tablet 3    calcium carbonate (OS-RAZ) 500 MG tablet Take 1 tablet by mouth 2 times daily      Flaxseed, Linseed, (FLAX SEED OIL) 1000 MG capsule Take 1 capsule by mouth daily.      fluocinonide (LIDEX) 0.05 % external ointment Apply topically as needed      Multiple Vitamins-Minerals (THRIVE FOR LIFE WOMENS PO)       tiZANidine (ZANAFLEX) 4 MG tablet   5    traMADol  (ULTRAM) 50 MG tablet       venlafaxine (EFFEXOR XR) 37.5 MG 24 hr capsule Take 1 capsule (37.5 mg) by mouth every other day. 45 capsule 3       PAST SURGICAL HISTORY:  Past Surgical History:   Procedure Laterality Date    BX/REMV,LYMPH NODE,DEEP AXILL  2007    BX/REMV,LYMPH NODE,DEEP AXILL  2007    Left axillary dissection - Breast cancer.    COLONOSCOPY  08/02/10    COLONOSCOPY N/A 2022    Procedure: COLONOSCOPY;  Surgeon: Thiago Osorio MD;  Location: [facility]    HC DILATION/CURETTAGE DIAG/THER NON OB      D & C x 2    HC EXCISION BREAST LESION, OPEN >=1  07/10/2007    HC TOOTH EXTRACTION W/FORCEP      wisdom teeth       ALLERGIES     Allergies   Allergen Reactions    No Known Drug Allergy        FAMILY HISTORY:  Family History   Problem Relation Age of Onset    C.A.D. Father          MI age 47    Osteoporosis Mother     Hypertension Mother     Osteoporosis Sister     Osteoporosis Sister     Cerebrovascular Disease Brother         massive stroke at 59    Osteoporosis Sister        SOCIAL HISTORY:  Social History     Socioeconomic History    Marital status:      Spouse name: Deejay    Number of children: 6    Years of education: None    Highest education level: None   Occupational History     Employer: [employer]   Tobacco Use    Smoking status: Never    Smokeless tobacco: Never    Tobacco comments:     no smokers in household   Vaping Use    Vaping status: Never Used   Substance and Sexual Activity    Alcohol use: Yes     Comment: occasional    Drug use: No    Sexual activity: Yes     Partners: Male     Birth control/protection: None     Comment: no b/c   Other Topics Concern     Service No    Blood Transfusions No    Caffeine Concern No     Comment: 1 cup coffee/day    Occupational Exposure No    Hobby Hazards No    Sleep Concern No    Stress Concern No    Weight Concern No    Special Diet No    Back Care No    Exercise Yes     Comment: walks    Seat Belt Yes    Self-Exams Yes      Comment: periodically    Parent/sibling w/ CABG, MI or angioplasty before 65F 55M? Yes     Comment: dad had a massive heartattack at age 47     Social Drivers of Health     Financial Resource Strain: Low Risk  (5/19/2025)    Financial Resource Strain     Within the past 12 months, have you or your family members you live with been unable to get utilities (heat, electricity) when it was really needed?: No   Food Insecurity: Low Risk  (5/19/2025)    Food Insecurity     Within the past 12 months, did you worry that your food would run out before you got money to buy more?: No     Within the past 12 months, did the food you bought just not last and you didn t have money to get more?: No   Transportation Needs: Low Risk  (5/19/2025)    Transportation Needs     Within the past 12 months, has lack of transportation kept you from medical appointments, getting your medicines, non-medical meetings or appointments, work, or from getting things that you need?: No   Physical Activity: Insufficiently Active (5/19/2025)    Exercise Vital Sign     Days of Exercise per Week: 2 days     Minutes of Exercise per Session: 30 min   Stress: No Stress Concern Present (5/19/2025)    [place] [employer] - Occupational Stress Questionnaire     Feeling of Stress : Not at all   Social Connections: Unknown (5/19/2025)    Social Connection and Isolation Panel [NHANES]     Frequency of Social Gatherings with Friends and Family: Once a week   Interpersonal Safety: Low Risk  (5/20/2025)    Interpersonal Safety     Do you feel physically and emotionally safe where you currently live?: Yes     Within the past 12 months, have you been hit, slapped, kicked or otherwise physically hurt by someone?: No     Within the past 12 months, have you been humiliated or emotionally abused in other ways by your partner or ex-partner?: No   Housing Stability: Low Risk  (5/19/2025)    Housing Stability     Do you have housing? : Yes     Are  you worried about losing your housing?: No       ROS:   Constitutional: No fever, chills, or sweats. No weight gain/loss   ENT: No visual disturbance, ear ache, epistaxis, sore throat  Allergies/Immunologic: Negative.   Respiratory: No cough, hemoptysia  Cardiovascular: As per HPI  GI: No nausea, vomiting, hematemesis, melena, or hematochezia  : No urinary frequency, dysuria, or hematuria  Integument: Negative  Psychiatric: Negative  Neuro: Negative  Endocrinology: Negative   Musculoskeletal: Negative    EXAM:  BP (!) 153/95 (BP Location: Right arm, Patient Position: Chair, Cuff Size: Adult Regular)   Pulse 85   Wt 65.5 kg (144 lb 4.8 oz)   LMP 06/02/2007   SpO2 97%   BMI 27.27 kg/m      In general, the patient is a pleasant female in no apparent distress but is intermittently tearful.    HEENT: NC/AT.  PERRLA.  EOMI.  Sclerae white, not injected.  Nares clear.  Pharynx without erythema or exudate.  Dentition intact.    Neck: No adenopathy.  No thyromegaly. Carotids +4/4 bilaterally without bruits.  No jugular venous distension.   Heart: RRR. Normal S1, S2 splits physiologically. No murmur, rub, click, or gallop. The PMI is in the 5th ICS in the midclavicular line. There is no heave.    Lungs: CTA.  No ronchi, wheezes, rales.  No dullness to percussion.   Abdomen: Soft, nontender, nondistended. No organomegaly.  No bruits.   Extremities: No clubbing, cyanosis, or edema.  The pulses are +4/4 at the radial, brachial, femoral, popliteal, DP, and PT sites bilaterally.  No bruits are noted.  Neurologic: Alert and oriented to person/place/time, normal speech, gait and affect  Skin: No petechiae, purpura or rash.    Labs:  LIPID RESULTS:  Lab Results   Component Value Date    CHOL 210 (H) 06/25/2025    CHOL 188 03/07/2018    HDL 58 06/25/2025    HDL 60 03/07/2018     (H) 06/25/2025    LDL 95 03/07/2018    TRIG 164 (H) 06/25/2025    TRIG 167 (H) 03/07/2018    CHOLHDLRATIO 4.0 07/09/2010    [facility] 152 (H)  06/25/2025    NHDL 128 03/07/2018       LIVER ENZYME RESULTS:  Lab Results   Component Value Date    AST 22 03/07/2018    ALT 42 03/07/2018       CBC RESULTS:  Lab Results   Component Value Date    WBC 5.8 06/25/2025    WBC 6.0 12/03/2019    RBC 4.82 06/25/2025    RBC 4.49 12/03/2019    HGB 14.9 06/25/2025    HGB 14.2 12/03/2019    HCT 43.6 06/25/2025    HCT 41.1 12/03/2019    MCV 91 06/25/2025    MCV 92 12/03/2019    MCH 30.9 06/25/2025    MCH 31.6 12/03/2019    MCHC 34.2 06/25/2025    MCHC 34.5 12/03/2019    RDW 12.1 06/25/2025    RDW 12.4 12/03/2019     06/25/2025     12/03/2019       BMP RESULTS:  Lab Results   Component Value Date     11/18/2015    POTASSIUM 4.4 11/18/2015    CHLORIDE 106 11/18/2015    CO2 30 11/18/2015    ANIONGAP 6 11/18/2015    GLC 95 01/24/2017    BUN 15 11/18/2015    CR 0.56 11/18/2015    GFRESTIMATED >90  Non  GFR Calc   11/18/2015    GFRESTBLACK >90   GFR Calc   11/18/2015    RAZ 9.6 11/18/2015        A1C RESULTS:  Lab Results   Component Value Date    A1C 5.5 05/17/2024           Procedures:  EKG: Sinus with Q in lead III only and reduced with breathhold EKG performed in clinic.    Echocardiogram:    Abnormal exercise stress echocardiogram with severe exertional dyspnea at a  low exercise capacity and evidence of mid to distal anterior, apical and  anteroseptal ischemia.    Poor image quality decreases accuracy  Elevated blood pressure at rest with hypertensive blood pressure response with  exercise.  Very poor exercise capacity.  Suggest cardiology consultation and cardiac catheterization if clinically  appropriate.  Findings discussed with referring provider's nurse, Lorna, today at 3:30 PM.  She will connect with covering provider. The study was technically difficult.  ______________________________________________________________________________  Stress  Exercise was stopped due to dyspnea.  There was a hypertensive BP response to  exercise.  The patient exhibited no chest pain during exercise.  Target Heart Rate was achieved.  There were no ST segment changes observed with stress.  No arrhythmia noted.  A low workload was achieved.  The visual ejection fraction is 50-55%.  There were stress-induced wall motion abnormalities observed.  Left ventricular cavity size does not change with exercise.  Global LV systolic function deteriorates with exercise.     Baseline  The patient is in normal sinus rhythm.  The visual ejection fraction is estimated at 55-60%.     Stress Results         Protocol:  Oleg          Maximum Predicted HR:   155 bpm         Target HR: 132 bpm        % Maximum Predicted HR: 129 %               Stage DurationHeart Rate   BP         Comment                   (mm:ss)   (bpm)           Stage 1  3:00      169   200/106           Stage 2  0:31      200      /   RPP [id number], FAC below          Stage 3R  5:00      107    154/78         Stress Duration:   3:31 mm:ss        Recovery Time: 5:00 mm:ss      Maximum Stress HR: 200 bpm           METS    Assessment and Plan:   #Atypical chest pain, unstable  #Hyperlipidemia  Patient describes ongoing atypical chest pain and appears to be unstable nature as this is occurring at rest and with exercise.  Based off of this and her significant family history of heart attacks and coronary artery disease along with abnormal stress test believe that best steps would be to move forward with coronary angiogram for evaluation and assessment of intervention.  - Cath Case request with angio and possible PCI  - Aspirin 81 mg daily  - Rosuvastatin 20 mg  - Lipoprotein (a)  - As needed sublingual nitroglycerin  - ER return precautions    #HTN  Patient with elevated blood pressures today at her appointment 153/95 on arrival up to 182/107.  Patient has never taken blood pressure medication prior to today  - Amlodipine 5 mg nightly  - Lisinopril 10 mg daily    Layton Jaquez  Cardiovascular Disease  Fellow    Medication Changes:   -Start lisinopril 10 mg daily, in the morning.  -Start amlodipine 5 mg daily, take between 8-10 pm.  -Start rosuvastatin 20 mg daily.  -Start aspirin 81 mg daily.  -Take nitroglycerin as needed for chest pain. Please follow instructions on bottle.     Follow Up:   -Home blood pressure monitor prescribed. Pt to monitor blood pressure and send some readings via mychart in a few weeks.  -Angiogram when able. Reviewed pre-procedure instructions with pt (see AVS).  -Follow up with JOSE 1-2 weeks after angiogram.    I saw and evaluated patient with CV fellow. I examined patient with CV fellow. I discussed the results with patient and CV fellow. I discussed our plan with patient and CV fellow.  I agree with CV fellow's note and I edited the CV fellow 's note to make it a more comprehensive document.    Tee Carson MD, PhD  Professor of Medicine  Division of Cardiology       CC  Patient Care Team:  Sowmya Perez MD as PCP - General (Family Practice)  Sowmya Perez MD as Assigned PCP  Tee Carson MD as MD (Cardiovascular Disease)  SOWMYA PEREZ

## 2025-07-03 NOTE — NURSING NOTE
Chief Complaint   Patient presents with    New Patient     NEW PREVENTATIVE - RAC consult - Abnormal stress test       Vitals were taken, medications reconciled.    Yudith Kolb, EMT    1:12 PM

## 2025-07-03 NOTE — TELEPHONE ENCOUNTER
Patient returned call and was updated on results and cardiology referral. She denies any new symptoms or chest pain, SOB at this time. Discussed red flag symptoms and when to present to the ED. She verbalized understanding and will call back with further questions. Gave patient cardiology number to schedule appointment.    Anita Tejada RN on 7/3/2025 at 9:55 AM

## 2025-07-03 NOTE — LETTER
7/3/2025      RE: Leia Kingston  [address]  [facility] [address]       Dear Colleague,    Thank you for the opportunity to participate in the care of your patient, Leia Kingston, at the [facility] [place] [facility]. Please see a copy of my visit note below.    HPI:   I had the privilege to evaluate and examine Mrs Leia Kingston, who is a 65 yr female past with a past medical history of breast cancer (triple negative) status post radiation and chemotherapy, osteopenia, and history of fibromyalgia who comes to clinic today for urgent cardiology follow-up with abnormal stress yesterday.    On interview, patient states that she has had episodes of chest pain since May 2025.  Initially these were sharp pain on her right chest.  This is nonpositional.  Has lasted for 10 to 15 minutes and occurs about 2-3 times a week in the evening.  Initial testing that was performed was targeted more towards pulmonary emboli but was negative.  She was told to follow-up.      More recently she has noted that this pain has changed to a dull like pressure on the right side and traveling to the back.  States this is more consistent and is not correlated with exercise or rest.  Unable to get this to stop based on position as well.  States that she has not had symptoms like this before.    At her PCP: EKG was performed showing sinus rhythm with lead III Q waves.  Additionally, had stress echo performed yesterday that demonstrated mid to distal anterior, apical, and anterior septal inducible ischemia.  She was unable to tolerate much exertion      PAST MEDICAL HISTORY:  Past Medical History:   Diagnosis Date     Malignant neoplasm of breast (female), unspecified site        CURRENT MEDICATIONS:  Current Outpatient Medications   Medication Sig Dispense Refill     aspirin 81 MG chewable tablet Take 1 tablet (81 mg) by mouth daily 108 tablet 3      calcium carbonate (OS-RAZ) 500 MG tablet Take 1 tablet by mouth 2 times daily       Flaxseed, Linseed, (FLAX SEED OIL) 1000 MG capsule Take 1 capsule by mouth daily.       fluocinonide (LIDEX) 0.05 % external ointment Apply topically as needed       Multiple Vitamins-Minerals (THRIVE FOR LIFE WOMENS PO)        tiZANidine (ZANAFLEX) 4 MG tablet   5     traMADol (ULTRAM) 50 MG tablet        venlafaxine (EFFEXOR XR) 37.5 MG 24 hr capsule Take 1 capsule (37.5 mg) by mouth every other day. 45 capsule 3       PAST SURGICAL HISTORY:  Past Surgical History:   Procedure Laterality Date     BX/REMV,LYMPH NODE,DEEP AXILL  2007     BX/REMV,LYMPH NODE,DEEP AXILL  2007    Left axillary dissection - Breast cancer.     COLONOSCOPY  08/02/10     COLONOSCOPY N/A 2022    Procedure: COLONOSCOPY;  Surgeon: Thiago Osorio MD;  Location: [facility]     HC DILATION/CURETTAGE DIAG/THER NON OB      D & C x 2     HC EXCISION BREAST LESION, OPEN >=1  07/10/2007     HC TOOTH EXTRACTION W/FORCEP      wisdom teeth       ALLERGIES     Allergies   Allergen Reactions     No Known Drug Allergy        FAMILY HISTORY:  Family History   Problem Relation Age of Onset     C.A.D. Father          MI age 47     Osteoporosis Mother      Hypertension Mother      Osteoporosis Sister      Osteoporosis Sister      Cerebrovascular Disease Brother         massive stroke at 59     Osteoporosis Sister        SOCIAL HISTORY:  Social History     Socioeconomic History     Marital status:      Spouse name: Deejay     Number of children: 6     Years of education: None     Highest education level: None   Occupational History     Employer: [employer]   Tobacco Use     Smoking status: Never     Smokeless tobacco: Never     Tobacco comments:     no smokers in household   Vaping Use     Vaping status: Never Used   Substance and Sexual Activity     Alcohol use: Yes     Comment: occasional     Drug use: No     Sexual activity: Yes     Partners: Male      Birth control/protection: None     Comment: no b/c   Other Topics Concern      Service No     Blood Transfusions No     Caffeine Concern No     Comment: 1 cup coffee/day     Occupational Exposure No     Hobby Hazards No     Sleep Concern No     Stress Concern No     Weight Concern No     Special Diet No     Back Care No     Exercise Yes     Comment: walks     Seat Belt Yes     Self-Exams Yes     Comment: periodically     Parent/sibling w/ CABG, MI or angioplasty before 65F 55M? Yes     Comment: dad had a massive heartattack at age 47     Social Drivers of Health     Financial Resource Strain: Low Risk  (5/19/2025)    Financial Resource Strain      Within the past 12 months, have you or your family members you live with been unable to get utilities (heat, electricity) when it was really needed?: No   Food Insecurity: Low Risk  (5/19/2025)    Food Insecurity      Within the past 12 months, did you worry that your food would run out before you got money to buy more?: No      Within the past 12 months, did the food you bought just not last and you didn t have money to get more?: No   Transportation Needs: Low Risk  (5/19/2025)    Transportation Needs      Within the past 12 months, has lack of transportation kept you from medical appointments, getting your medicines, non-medical meetings or appointments, work, or from getting things that you need?: No   Physical Activity: Insufficiently Active (5/19/2025)    Exercise Vital Sign      Days of Exercise per Week: 2 days      Minutes of Exercise per Session: 30 min   Stress: No Stress Concern Present (5/19/2025)    [employer] [place] - Occupational Stress Questionnaire      Feeling of Stress : Not at all   Social Connections: Unknown (5/19/2025)    Social Connection and Isolation Panel [NHANES]      Frequency of Social Gatherings with Friends and Family: Once a week   Interpersonal Safety: Low Risk  (5/20/2025)    Interpersonal Safety      Do  you feel physically and emotionally safe where you currently live?: Yes      Within the past 12 months, have you been hit, slapped, kicked or otherwise physically hurt by someone?: No      Within the past 12 months, have you been humiliated or emotionally abused in other ways by your partner or ex-partner?: No   Housing Stability: Low Risk  (5/19/2025)    Housing Stability      Do you have housing? : Yes      Are you worried about losing your housing?: No       ROS:   Constitutional: No fever, chills, or sweats. No weight gain/loss   ENT: No visual disturbance, ear ache, epistaxis, sore throat  Allergies/Immunologic: Negative.   Respiratory: No cough, hemoptysia  Cardiovascular: As per HPI  GI: No nausea, vomiting, hematemesis, melena, or hematochezia  : No urinary frequency, dysuria, or hematuria  Integument: Negative  Psychiatric: Negative  Neuro: Negative  Endocrinology: Negative   Musculoskeletal: Negative    EXAM:  BP (!) 153/95 (BP Location: Right arm, Patient Position: Chair, Cuff Size: Adult Regular)   Pulse 85   Wt 65.5 kg (144 lb 4.8 oz)   LMP 06/02/2007   SpO2 97%   BMI 27.27 kg/m      In general, the patient is a pleasant female in no apparent distress but is intermittently tearful.    HEENT: NC/AT.  PERRLA.  EOMI.  Sclerae white, not injected.  Nares clear.  Pharynx without erythema or exudate.  Dentition intact.    Neck: No adenopathy.  No thyromegaly. Carotids +4/4 bilaterally without bruits.  No jugular venous distension.   Heart: RRR. Normal S1, S2 splits physiologically. No murmur, rub, click, or gallop. The PMI is in the 5th ICS in the midclavicular line. There is no heave.    Lungs: CTA.  No ronchi, wheezes, rales.  No dullness to percussion.   Abdomen: Soft, nontender, nondistended. No organomegaly.  No bruits.   Extremities: No clubbing, cyanosis, or edema.  The pulses are +4/4 at the radial, brachial, femoral, popliteal, DP, and PT sites bilaterally.  No bruits are noted.  Neurologic:  Alert and oriented to person/place/time, normal speech, gait and affect  Skin: No petechiae, purpura or rash.    Labs:  LIPID RESULTS:  Lab Results   Component Value Date    CHOL 210 (H) 06/25/2025    CHOL 188 03/07/2018    HDL 58 06/25/2025    HDL 60 03/07/2018     (H) 06/25/2025    LDL 95 03/07/2018    TRIG 164 (H) 06/25/2025    TRIG 167 (H) 03/07/2018    CHOLHDLRATIO 4.0 07/09/2010    NHDL 152 (H) 06/25/2025    NHDL 128 03/07/2018       LIVER ENZYME RESULTS:  Lab Results   Component Value Date    AST 22 03/07/2018    ALT 42 03/07/2018       CBC RESULTS:  Lab Results   Component Value Date    WBC 5.8 06/25/2025    WBC 6.0 12/03/2019    RBC 4.82 06/25/2025    RBC 4.49 12/03/2019    HGB 14.9 06/25/2025    HGB 14.2 12/03/2019    HCT 43.6 06/25/2025    HCT 41.1 12/03/2019    MCV 91 06/25/2025    MCV 92 12/03/2019    MCH 30.9 06/25/2025    MCH 31.6 12/03/2019    MCHC 34.2 06/25/2025    MCHC 34.5 12/03/2019    RDW 12.1 06/25/2025    RDW 12.4 12/03/2019     06/25/2025     12/03/2019       BMP RESULTS:  Lab Results   Component Value Date     11/18/2015    POTASSIUM 4.4 11/18/2015    CHLORIDE 106 11/18/2015    CO2 30 11/18/2015    ANIONGAP 6 11/18/2015    GLC 95 01/24/2017    BUN 15 11/18/2015    CR 0.56 11/18/2015    GFRESTIMATED >90  Non  GFR Calc   11/18/2015    GFRESTBLACK >90   GFR Calc   11/18/2015    RAZ 9.6 11/18/2015        A1C RESULTS:  Lab Results   Component Value Date    A1C 5.5 05/17/2024           Procedures:  EKG: Sinus with Q in lead III only and reduced with breathhold EKG performed in clinic.    Echocardiogram:    Abnormal exercise stress echocardiogram with severe exertional dyspnea at a  low exercise capacity and evidence of mid to distal anterior, apical and  anteroseptal ischemia.    Poor image quality decreases accuracy  Elevated blood pressure at rest with hypertensive blood pressure response with  exercise.  Very poor exercise  capacity.  Suggest cardiology consultation and cardiac catheterization if clinically  appropriate.  Findings discussed with referring provider's nurse, Lorna, today at 3:30 PM.  She will connect with covering provider. The study was technically difficult.  ______________________________________________________________________________  Stress  Exercise was stopped due to dyspnea.  There was a hypertensive BP response to exercise.  The patient exhibited no chest pain during exercise.  Target Heart Rate was achieved.  There were no ST segment changes observed with stress.  No arrhythmia noted.  A low workload was achieved.  The visual ejection fraction is 50-55%.  There were stress-induced wall motion abnormalities observed.  Left ventricular cavity size does not change with exercise.  Global LV systolic function deteriorates with exercise.     Baseline  The patient is in normal sinus rhythm.  The visual ejection fraction is estimated at 55-60%.     Stress Results         Protocol:  Oleg          Maximum Predicted HR:   155 bpm         Target HR: 132 bpm        % Maximum Predicted HR: 129 %               Stage DurationHeart Rate   BP         Comment                   (mm:ss)   (bpm)           Stage 1  3:00      169   200/106           Stage 2  0:31      200      /   RPP 20967, FAC below          Stage 3R  5:00      107    154/78         Stress Duration:   3:31 mm:ss        Recovery Time: 5:00 mm:ss      Maximum Stress HR: 200 bpm           METS    Assessment and Plan:   #Atypical chest pain, unstable  #Hyperlipidemia  Patient describes ongoing atypical chest pain and appears to be unstable nature as this is occurring at rest and with exercise.  Based off of this and her significant family history of heart attacks and coronary artery disease along with abnormal stress test believe that best steps would be to move forward with coronary angiogram for evaluation and assessment of intervention.  - Cath Case request with  angio and possible PCI  - Aspirin 81 mg daily  - Rosuvastatin 20 mg  - Lipoprotein (a)  - As needed sublingual nitroglycerin  - ER return precautions    #HTN  Patient with elevated blood pressures today at her appointment 153/95 on arrival up to 182/107.  Patient has never taken blood pressure medication prior to today  - Amlodipine 5 mg nightly  - Lisinopril 10 mg daily    Layton HEATHER Leonid  Cardiovascular Disease Fellow    Medication Changes:   -Start lisinopril 10 mg daily, in the morning.  -Start amlodipine 5 mg daily, take between 8-10 pm.  -Start rosuvastatin 20 mg daily.  -Start aspirin 81 mg daily.  -Take nitroglycerin as needed for chest pain. Please follow instructions on bottle.     Follow Up:   -Home blood pressure monitor prescribed. Pt to monitor blood pressure and send some readings via [employer] in a few weeks.  -Angiogram when able. Reviewed pre-procedure instructions with pt (see AVS).  -Follow up with JOSE 1-2 weeks after angiogram.    I saw and evaluated patient with CV fellow. I examined patient with CV fellow. I discussed the results with patient and CV fellow. I discussed our plan with patient and CV fellow.  I agree with CV fellow's note and I edited the CV fellow 's note to make it a more comprehensive document.    Tee Carson MD, PhD  Professor of Medicine  Division of Cardiology       CC  Patient Care Team:  Sowmya Perez MD as PCP - General (Family Practice)  Sowmya Perez MD as Assigned PCP  Tee Carson MD as MD (Cardiovascular Disease)  SOWMYA PEREZ      Please do not hesitate to contact me if you have any questions/concerns.     Sincerely,     Tee Carsno MD

## 2025-07-03 NOTE — TELEPHONE ENCOUNTER
I recommend urgent cardiac consult, order placed.  [facility] this [facility] I had the stress result and a staff message from Cardiology below.  If patient having chest pain or develops chest pain over the weekend, she will need to go to the ED.    Received staff message--  Charmaine Bergman RN Christiaansen, Tori F, MD  Good afternoon Sowmya,    I performed Leia's stress echo this afternoon and have some concerns. Her BP was elevated  during exam (pretest 168/104 and 166/96), rising up to 200/106 with exercise. During stress she abruptly went into an SVT with rates exceeding 200 and associated symptoms of fatigue, dyspnea and diaphoresis. No chest pain. Time on treadmill 03:31. Echo images reflect some change in the apex and anterior portions. STAT read requested.  Vitals and EKG post stress returned to baseline.  Patient proceeded up to her CT scan.  I did huddle with Dr. Steiner (stress testing supervising MD today), agrees with plan to send patient home and await results.      Just an FYI :)    Charmaine Bergman RN    Stress result--    Interpretation Summary  Abnormal exercise stress echocardiogram with severe exertional dyspnea at a  low exercise capacity and evidence of mid to distal anterior, apical and  anteroseptal ischemia.  Poor image quality decreases accuracy  Elevated blood pressure at rest with hypertensive blood pressure response with  exercise.  Very poor exercise capacity.  Suggest cardiology consultation and cardiac catheterization if clinically  appropriate.  Findings discussed with referring provider's nurse, Lorna, today at 3:30 PM.  She will connect with covering provider. The study was technically difficult.

## 2025-07-03 NOTE — NURSING NOTE
July 3, 2025    Medication Changes:   -Start lisinopril 10 mg daily, in the morning.  -Start amlodipine 5 mg daily, take between 8-10 pm.  -Start rosuvastatin 20 mg daily.  -Start aspirin 81 mg daily.  -Take nitroglycerin as needed for chest pain. Please follow instructions on bottle.    Follow Up:   -Home blood pressure monitor prescribed. Pt to monitor blood pressure and send some readings via [employer] in a few weeks.  -Angiogram when able. Reviewed pre-procedure instructions with pt (see AVS).  -Follow up with JOSE 1-2 weeks after angiogram.      Patient verbalized understanding of all health information given and agreed to call with further questions or concerns.      Consuelo Ruiz RN

## 2025-07-03 NOTE — PATIENT INSTRUCTIONS
July 3, 2025    Cardiology Provider You Saw During Your Visit: Dr. Carson      Medication Changes:   -Start lisinopril 10 mg daily, in the morning.  -Start amlodipine 5 mg daily, take between 8-10 pm.  -Start rosuvastatin 20 mg daily.  -Start aspirin 81 mg daily.  -Take nitroglycerin as needed for chest pain. Please follow instructions on bottle.    Follow Up:   -Home blood pressure monitor prescribed--OMRON brand. Please monitor blood pressure and send some readings via [employer] in a few weeks.  -Angiogram when able.  -Follow up with JOSE 1-2 weeks after angiogram.    ========================================================  Pre-procedure instructions - Coronary Angiogram  Patient Education    Your arrival time is TBD.  Location is [address] [facility]  Please plan on being at the hospital all day. If you are on dialysis, DO NOT schedule on a dialysis day.  At any time, emergencies and/or urgent cases may come up which could delay the start of your procedure.    Pre-procedure instructions - Coronary Angiogram  Shower in the evening before or the morning of the procedure  No solid food for 8 hours prior and nothing to drink 2 hours prior to arrival time  You can take your morning medications (except for diabetic and blood thinners) with sips of water.  Take 325 mg of Aspirin the night before and the morning of your procedure.  You will need to arrange a ride to drop you off and , as you will be unable to drive home. Prior to discharge you may be required to lay flat for approximately 2-6 hours in the recovery unit to ensure proper clotting of the artery. Please note: You cannot take an Uber/Taxi/etc unless you are accompanied by someone.You will need a responsible adult to stay with you for 24 hours post-procedure.              Diabetic Medication Instructions  Hold oral diabetic medication in morning of your procedure  and for 48 hours after IV contrast is given  Typical instructions for insulin diabetic medication holding are below. However, please reach out to your Primary Care Provider or Endocrinologist for specific instructions  DO NOT take any oral diabetic medication, short-acting diabetes medications/insulin, humalog or regular insulin the morning of your test  Take   dose of long-acting insulin (Lantus, Levemir) the day of your test  Remember to bring your glucometer and insulin with you to take after your test if needed  GLP-1 Agonists Instructions  DO NOT take injectable GLP-1 agonists semaglutide (Ozempic, Wegovy), dulaglutide (Trulicity), exenatide ER (Bydureon), tirzepatide (Mounjaro), or oral semaglutide (Rybelsus) for 7 days prior your procedure  Hold once daily injectable GLP-1 agonists exenatide (Byetta), liraglutide (Saxenda, Victoza), lixisenatide (Soligua) the day before and day of your procedure                  Anticoagulation Medication Instructions   NA  Write N/A if not currently taking    You will need to follow up with one of our cardiology APPs 1-2 weeks after your procedure. If you need help scheduling or rescheduling your appointment, please call [phone]  ========================================================    Follow the American Heart Association Diet and Lifestyle Recommendations:  -Limit saturated fat, trans fat, sodium, red meat, sweets and sugar-sweetened beverages. If you choose to eat red meat, compare labels and select the leanest cuts available.  -Aim for at least 150 minutes of moderate physical activity or 75 minutes of vigorous physical activity - or an equal combination of both - each week.      To Reach Us:  -During business hours: [phone], press option # 1 to schedule an appointment or to leave a message for your care team.     -After hours, weekends or holidays: [phone], press option #4 and ask to speak to the on-call cardiologist. Inform them you are a patient at  [facility].        **If you have a cardiac device, please make sure you schedule an in-person device check just prior to your cardiology provider appointments**        Consuelo Ruiz RN  Cardiology Care Coordinator - General Cardiology  [facility]

## 2025-07-05 PROBLEM — E78.5 HLD (HYPERLIPIDEMIA): Status: ACTIVE | Noted: 2025-07-05

## 2025-07-05 PROBLEM — R07.89 ATYPICAL CHEST PAIN: Status: ACTIVE | Noted: 2025-07-05

## 2025-07-06 LAB
ATRIAL RATE - MUSE: 84 BPM
ATRIAL RATE - MUSE: 86 BPM
DIASTOLIC BLOOD PRESSURE - MUSE: NORMAL MMHG
DIASTOLIC BLOOD PRESSURE - MUSE: NORMAL MMHG
INTERPRETATION ECG - MUSE: NORMAL
INTERPRETATION ECG - MUSE: NORMAL
P AXIS - MUSE: 57 DEGREES
P AXIS - MUSE: 60 DEGREES
PR INTERVAL - MUSE: 134 MS
PR INTERVAL - MUSE: 134 MS
QRS DURATION - MUSE: 84 MS
QRS DURATION - MUSE: 84 MS
QT - MUSE: 380 MS
QT - MUSE: 384 MS
QTC - MUSE: 453 MS
QTC - MUSE: 454 MS
R AXIS - MUSE: 48 DEGREES
R AXIS - MUSE: 48 DEGREES
SYSTOLIC BLOOD PRESSURE - MUSE: NORMAL MMHG
SYSTOLIC BLOOD PRESSURE - MUSE: NORMAL MMHG
T AXIS - MUSE: 46 DEGREES
T AXIS - MUSE: 49 DEGREES
VENTRICULAR RATE- MUSE: 84 BPM
VENTRICULAR RATE- MUSE: 86 BPM

## 2025-07-07 ENCOUNTER — TELEPHONE (OUTPATIENT)
Dept: CARDIOLOGY | Facility: CLINIC | Age: 65
End: 2025-07-07
Payer: COMMERCIAL

## 2025-07-22 ENCOUNTER — HOSPITAL ENCOUNTER (OUTPATIENT)
Facility: CLINIC | Age: 65
Discharge: HOME | End: 2025-07-22
Attending: INTERNAL MEDICINE | Admitting: INTERNAL MEDICINE
Payer: COMMERCIAL

## 2025-07-22 VITALS
WEIGHT: 142 LBS | OXYGEN SATURATION: 97 % | SYSTOLIC BLOOD PRESSURE: 119 MMHG | TEMPERATURE: 98.3 F | HEART RATE: 74 BPM | BODY MASS INDEX: 26.83 KG/M2 | RESPIRATION RATE: 15 BRPM | DIASTOLIC BLOOD PRESSURE: 66 MMHG

## 2025-07-22 DIAGNOSIS — R07.89 ATYPICAL CHEST PAIN: ICD-10-CM

## 2025-07-22 DIAGNOSIS — R94.39 ABNORMAL STRESS TEST: ICD-10-CM

## 2025-07-22 PROBLEM — Z98.890 STATUS POST CORONARY ANGIOGRAM: Status: ACTIVE | Noted: 2025-07-22

## 2025-07-22 LAB
ANION GAP SERPL CALCULATED.3IONS-SCNC: 12 MMOL/L (ref 7–15)
APTT PPP: 29 SECONDS (ref 22–38)
ATRIAL RATE - MUSE: 75 BPM
BUN SERPL-MCNC: 12.7 MG/DL (ref 8–23)
CALCIUM SERPL-MCNC: 10.8 MG/DL (ref 8.8–10.4)
CHLORIDE SERPL-SCNC: 105 MMOL/L (ref 98–107)
CREAT SERPL-MCNC: 0.74 MG/DL (ref 0.51–0.95)
DIASTOLIC BLOOD PRESSURE - MUSE: NORMAL MMHG
EGFRCR SERPLBLD CKD-EPI 2021: 89 ML/MIN/1.73M2
ERYTHROCYTE [DISTWIDTH] IN BLOOD BY AUTOMATED COUNT: 11.9 % (ref 10–15)
GLUCOSE SERPL-MCNC: 106 MG/DL (ref 70–99)
HCG INTACT+B SERPL-ACNC: 6 MIU/ML
HCO3 SERPL-SCNC: 25 MMOL/L (ref 22–29)
HCT VFR BLD AUTO: 42 % (ref 35–47)
HGB BLD-MCNC: 14.8 G/DL (ref 11.7–15.7)
INR PPP: 0.97 (ref 0.85–1.15)
INTERPRETATION ECG - MUSE: NORMAL
MCH RBC QN AUTO: 31.2 PG (ref 26.5–33)
MCHC RBC AUTO-ENTMCNC: 35.2 G/DL (ref 31.5–36.5)
MCV RBC AUTO: 88 FL (ref 78–100)
P AXIS - MUSE: 54 DEGREES
PLATELET # BLD AUTO: 176 10E3/UL (ref 150–450)
POTASSIUM SERPL-SCNC: 4.3 MMOL/L (ref 3.4–5.3)
PR INTERVAL - MUSE: 134 MS
PROTHROMBIN TIME: 13.2 SECONDS (ref 11.8–14.8)
QRS DURATION - MUSE: 84 MS
QT - MUSE: 392 MS
QTC - MUSE: 437 MS
R AXIS - MUSE: 12 DEGREES
RBC # BLD AUTO: 4.75 10E6/UL (ref 3.8–5.2)
SODIUM SERPL-SCNC: 142 MMOL/L (ref 135–145)
SYSTOLIC BLOOD PRESSURE - MUSE: NORMAL MMHG
T AXIS - MUSE: 20 DEGREES
VENTRICULAR RATE- MUSE: 75 BPM
WBC # BLD AUTO: 5.1 10E3/UL (ref 4–11)

## 2025-07-22 PROCEDURE — 258N000003 HC RX IP 258 OP 636: Performed by: INTERNAL MEDICINE

## 2025-07-22 PROCEDURE — 250N000011 HC RX IP 250 OP 636: Performed by: INTERNAL MEDICINE

## 2025-07-22 PROCEDURE — C1726 CATH, BAL DIL, NON-VASCULAR: HCPCS | Performed by: INTERNAL MEDICINE

## 2025-07-22 PROCEDURE — 85018 HEMOGLOBIN: CPT | Performed by: INTERNAL MEDICINE

## 2025-07-22 PROCEDURE — 999N000054 HC STATISTIC EKG NON-CHARGEABLE

## 2025-07-22 PROCEDURE — 80048 BASIC METABOLIC PNL TOTAL CA: CPT | Performed by: INTERNAL MEDICINE

## 2025-07-22 PROCEDURE — 93010 ELECTROCARDIOGRAM REPORT: CPT | Mod: XU | Performed by: INTERNAL MEDICINE

## 2025-07-22 PROCEDURE — 93005 ELECTROCARDIOGRAM TRACING: CPT

## 2025-07-22 PROCEDURE — 85610 PROTHROMBIN TIME: CPT | Performed by: INTERNAL MEDICINE

## 2025-07-22 PROCEDURE — 93454 CORONARY ARTERY ANGIO S&I: CPT | Mod: 26 | Performed by: INTERNAL MEDICINE

## 2025-07-22 PROCEDURE — 36415 COLL VENOUS BLD VENIPUNCTURE: CPT | Performed by: INTERNAL MEDICINE

## 2025-07-22 PROCEDURE — 93454 CORONARY ARTERY ANGIO S&I: CPT | Performed by: INTERNAL MEDICINE

## 2025-07-22 PROCEDURE — 99152 MOD SED SAME PHYS/QHP 5/>YRS: CPT | Mod: GC | Performed by: INTERNAL MEDICINE

## 2025-07-22 PROCEDURE — C1887 CATHETER, GUIDING: HCPCS | Performed by: INTERNAL MEDICINE

## 2025-07-22 PROCEDURE — 99152 MOD SED SAME PHYS/QHP 5/>YRS: CPT | Performed by: INTERNAL MEDICINE

## 2025-07-22 PROCEDURE — 250N000009 HC RX 250: Performed by: INTERNAL MEDICINE

## 2025-07-22 PROCEDURE — C1894 INTRO/SHEATH, NON-LASER: HCPCS | Performed by: INTERNAL MEDICINE

## 2025-07-22 PROCEDURE — 84702 CHORIONIC GONADOTROPIN TEST: CPT | Performed by: INTERNAL MEDICINE

## 2025-07-22 PROCEDURE — 272N000001 HC OR GENERAL SUPPLY STERILE: Performed by: INTERNAL MEDICINE

## 2025-07-22 PROCEDURE — 85730 THROMBOPLASTIN TIME PARTIAL: CPT | Performed by: INTERNAL MEDICINE

## 2025-07-22 RX ORDER — FENTANYL CITRATE 50 UG/ML
25 INJECTION, SOLUTION INTRAMUSCULAR; INTRAVENOUS
Refills: 0 | Status: DISCONTINUED | OUTPATIENT
Start: 2025-07-22 | End: 2025-07-22 | Stop reason: HOSPADM

## 2025-07-22 RX ORDER — NALOXONE HYDROCHLORIDE 0.4 MG/ML
0.2 INJECTION, SOLUTION INTRAMUSCULAR; INTRAVENOUS; SUBCUTANEOUS
Status: DISCONTINUED | OUTPATIENT
Start: 2025-07-22 | End: 2025-07-22 | Stop reason: HOSPADM

## 2025-07-22 RX ORDER — HEPARIN SODIUM 1000 [USP'U]/ML
INJECTION, SOLUTION INTRAVENOUS; SUBCUTANEOUS
Status: DISCONTINUED | OUTPATIENT
Start: 2025-07-22 | End: 2025-07-22 | Stop reason: HOSPADM

## 2025-07-22 RX ORDER — POTASSIUM CHLORIDE 750 MG/1
20 TABLET, EXTENDED RELEASE ORAL
Status: DISCONTINUED | OUTPATIENT
Start: 2025-07-22 | End: 2025-07-22 | Stop reason: HOSPADM

## 2025-07-22 RX ORDER — FLUMAZENIL 0.1 MG/ML
0.2 INJECTION, SOLUTION INTRAVENOUS
Status: DISCONTINUED | OUTPATIENT
Start: 2025-07-22 | End: 2025-07-22 | Stop reason: HOSPADM

## 2025-07-22 RX ORDER — ACETAMINOPHEN 325 MG/1
650 TABLET ORAL EVERY 4 HOURS PRN
Status: DISCONTINUED | OUTPATIENT
Start: 2025-07-22 | End: 2025-07-22 | Stop reason: HOSPADM

## 2025-07-22 RX ORDER — POTASSIUM CHLORIDE 750 MG/1
40 TABLET, EXTENDED RELEASE ORAL
Status: DISCONTINUED | OUTPATIENT
Start: 2025-07-22 | End: 2025-07-22 | Stop reason: HOSPADM

## 2025-07-22 RX ORDER — LIDOCAINE 40 MG/G
CREAM TOPICAL
Status: DISCONTINUED | OUTPATIENT
Start: 2025-07-22 | End: 2025-07-22 | Stop reason: HOSPADM

## 2025-07-22 RX ORDER — NALOXONE HYDROCHLORIDE 0.4 MG/ML
0.4 INJECTION, SOLUTION INTRAMUSCULAR; INTRAVENOUS; SUBCUTANEOUS
Status: DISCONTINUED | OUTPATIENT
Start: 2025-07-22 | End: 2025-07-22 | Stop reason: HOSPADM

## 2025-07-22 RX ORDER — ASPIRIN 325 MG
325 TABLET ORAL ONCE
Status: COMPLETED | OUTPATIENT
Start: 2025-07-22 | End: 2025-07-22

## 2025-07-22 RX ORDER — OXYCODONE HYDROCHLORIDE 10 MG/1
10 TABLET ORAL EVERY 4 HOURS PRN
Status: DISCONTINUED | OUTPATIENT
Start: 2025-07-22 | End: 2025-07-22 | Stop reason: HOSPADM

## 2025-07-22 RX ORDER — ATROPINE SULFATE 0.1 MG/ML
0.5 INJECTION INTRAVENOUS
Status: DISCONTINUED | OUTPATIENT
Start: 2025-07-22 | End: 2025-07-22 | Stop reason: HOSPADM

## 2025-07-22 RX ORDER — NICARDIPINE HYDROCHLORIDE 2.5 MG/ML
INJECTION INTRAVENOUS
Status: DISCONTINUED | OUTPATIENT
Start: 2025-07-22 | End: 2025-07-22 | Stop reason: HOSPADM

## 2025-07-22 RX ORDER — SODIUM CHLORIDE 9 MG/ML
INJECTION, SOLUTION INTRAVENOUS CONTINUOUS
Status: DISCONTINUED | OUTPATIENT
Start: 2025-07-22 | End: 2025-07-22 | Stop reason: HOSPADM

## 2025-07-22 RX ORDER — DIPHENHYDRAMINE HYDROCHLORIDE 50 MG/ML
INJECTION, SOLUTION INTRAMUSCULAR; INTRAVENOUS
Status: DISCONTINUED | OUTPATIENT
Start: 2025-07-22 | End: 2025-07-22 | Stop reason: HOSPADM

## 2025-07-22 RX ORDER — FENTANYL CITRATE 50 UG/ML
INJECTION, SOLUTION INTRAMUSCULAR; INTRAVENOUS
Status: DISCONTINUED | OUTPATIENT
Start: 2025-07-22 | End: 2025-07-22 | Stop reason: HOSPADM

## 2025-07-22 RX ORDER — IOPAMIDOL 755 MG/ML
INJECTION, SOLUTION INTRAVASCULAR
Status: DISCONTINUED | OUTPATIENT
Start: 2025-07-22 | End: 2025-07-22 | Stop reason: HOSPADM

## 2025-07-22 RX ORDER — ASPIRIN 81 MG/1
243 TABLET, CHEWABLE ORAL ONCE
Status: COMPLETED | OUTPATIENT
Start: 2025-07-22 | End: 2025-07-22

## 2025-07-22 RX ORDER — OXYCODONE HYDROCHLORIDE 5 MG/1
5 TABLET ORAL EVERY 4 HOURS PRN
Status: DISCONTINUED | OUTPATIENT
Start: 2025-07-22 | End: 2025-07-22 | Stop reason: HOSPADM

## 2025-07-22 RX ORDER — NITROGLYCERIN 5 MG/ML
VIAL (ML) INTRAVENOUS
Status: DISCONTINUED | OUTPATIENT
Start: 2025-07-22 | End: 2025-07-22 | Stop reason: HOSPADM

## 2025-07-22 RX ADMIN — SODIUM CHLORIDE: 0.9 INJECTION, SOLUTION INTRAVENOUS at 09:49

## 2025-07-22 ASSESSMENT — ACTIVITIES OF DAILY LIVING (ADL)
ADLS_ACUITY_SCORE: 43
ADLS_ACUITY_SCORE: 41
ADLS_ACUITY_SCORE: 43

## 2025-07-22 NOTE — PROGRESS NOTES
Right radial TR band deflated and site covered with primapore remained soft, dry and intact. CMS intact. Vitals stable. Rhythm: SR. Alert and oriented. Denies any pain. Pt tolerating po intake, voided and ambulated in larios with steady gait. Discharge instructions were reviewed with pt and spouse and they verbalizes understanding. Copy of AVS given. PIV removed. Pt escorted via wheelchair to [facility] accompanied by spouse. Pt has all her belongings.

## 2025-07-22 NOTE — PROGRESS NOTES
Pt arrived to [facility] from home for Cors. VSS. Reports baseline chest discomfort. Awaiting consent and lab to result. H&P current. Appropriately NPO. ASA 325mg taken last night and this morning. Pedal pulse marked and groin prepped. Limb alert band on right arm. Prep completed.  at bedside; will be transporting patient to home

## 2025-07-22 NOTE — PRE-PROCEDURE
Consenting/Education for Cardiology Procedure: Possible percutaneous intervention and Coronary angiogram    Patient understands we would like to perform the listed procedure(s) due to abnormal stress test.    The patient understands the following:     The procedure was described to the patient in detail.    Moderate sedation is required for this procedure and the risks, benefits and alternatives to moderate sedation were discussed. Patient also understands risks and complications of the procedure which include but are not limited to bruising/swelling around the incision site, infection, bleeding, allergic reaction to local anesthetic, air embolism, arterial puncture, stroke, heart attack, need for emergency surgery, death.    Patient verbalized understanding of risks and benefits and has elected to proceed with the procedure or procedures listed above.    SHABBIR Waters CNP  Cardiology

## 2025-07-22 NOTE — DISCHARGE INSTRUCTIONS
Going Home after a Coronary Angiogram        After you go home:  Have an adult stay with you for 24 hours.  Drink plenty of fluids.  You may eat your normal diet, unless your doctor tells you otherwise.  For 24 hours:  - Relax and take it easy.  - Do NOT smoke.  - Do NOT make any important or legal decisions.  - Do NOT drive or operate machines at home or at work.  - Do NOT drink alcohol.    Remove the Dressing after 24 hours. If there is minor oozing, apply another Band-aid and remove it after 12 hours.  For 2 days, do NOT have sex or do any heavy exercise.  Do NOT take a bath, or use a hot tub or pool for at least 3 days. You may shower.    Care of wrist or arm site  It is normal to have soreness at the puncture site and mild tingling in your hand for up to 3 days.  For 2 days, do not use your hand or arm to support your weight (such as rising from a chair) or bend your wrist (such as lifting a garage door).  For 2 days, do not lift more than 5 pounds or exercise your arm (tennis, golf or bowling).      If you start bleeding from the site in your arm:  Sit down and press firmly on the site with your fingers for 10 minutes. Call your doctor as soon as you can.  If the bleeding stops, sit still and keep your wrist straight for 2 hours.    Medicines  No changes to your medications.     Call your doctor if:  You have a large or growing hard lump around the site.    The site is red, swollen, hot or tender.  Blood or fluid is draining from the site.  You have chills or a fever greater than 101 F (38 C).  Your leg or arm turns bluish, feels numb or cool.  You have hives, a rash or unusual itching.    Call 911 right away if you have:   Bleeding that does not stop.   Heavy bleeding.    If you have any questions or concerns regarding your procedure site please call [phone] at anytime, and hit option 4 to get the . Ask for the Cardiology Fellow on call.  They are available 24 hours a day.  You may also contact  the Cardiology Clinic after hours number at [phone].                                                       Telephone Numbers [phone] Monday-Friday 8:00 am to 4:30 pm    [phone] After 4:30 pm Monday-Friday, Weekends & Holidays  Ask for Interventional Cardiologist on call. Someone is on call 24 hours/day   [facility] toll free number [phone] Monday-Friday 8:00 am to 4:30 pm   [facility] Emergency Dept [phone]

## 2025-07-23 ENCOUNTER — TELEPHONE (OUTPATIENT)
Dept: CARDIOLOGY | Facility: CLINIC | Age: 65
End: 2025-07-23
Payer: COMMERCIAL

## 2025-07-23 NOTE — TELEPHONE ENCOUNTER
"Post-Angiogram Discharge Call    How are you feeling since you got home? Do you understand your results?    \"Feeling fine. Had chest pain last night - took a nitro and it seemed to help. But right now it feels like I pulled a muscle In my back. Nery been using a hot pack on my back.\"    Yes - Results discussed    How is your groin/wrist/incision site? Can you please describe it?  Good, looking fine. Bandage still in place and no bleeding noted.     Do you have any questions regarding your restrictions and when to resume normal activity?  No    Do you have the anti-platelet medication you were prescribed?  N/A    Any questions about the other medications you were prescribed?  N/A    Has cardiac rehab reached out to you?  N/A - PCTA not performed or not indicated    Do you have any other questions about the discharge instructions?  No    Has a follow up appointment been made within 1-2 weeks?  NO - upcoming appt with Teri Arcos NP on August / 05 / 2025    Have LDL recheck labs been ordered for post- PCI and CABG patients?  No - N/A      Patient will send in her most recent blood pressure readings for Dr. Carson to review. She was encouraged to take nitroglycerin as prescribed and advised to contact the cardiology office if she experiences any further episodes of chest pain.    DEBBY ArvizuN, RN  RN Care Coordinator - General Cardiology   [facility]    "

## 2025-08-05 ENCOUNTER — OFFICE VISIT (OUTPATIENT)
Dept: CARDIOLOGY | Facility: CLINIC | Age: 65
End: 2025-08-05
Attending: NURSE PRACTITIONER
Payer: COMMERCIAL

## 2025-08-05 ENCOUNTER — ORDERS ONLY (AUTO-RELEASED) (OUTPATIENT)
Dept: CARDIOLOGY | Facility: CLINIC | Age: 65
End: 2025-08-05

## 2025-08-05 VITALS
HEART RATE: 92 BPM | BODY MASS INDEX: 27.49 KG/M2 | DIASTOLIC BLOOD PRESSURE: 82 MMHG | WEIGHT: 145.5 LBS | SYSTOLIC BLOOD PRESSURE: 145 MMHG | OXYGEN SATURATION: 100 %

## 2025-08-05 DIAGNOSIS — Z82.49 FAMILY HISTORY OF AORTIC DISSECTION: ICD-10-CM

## 2025-08-05 DIAGNOSIS — R07.89 ATYPICAL CHEST PAIN: ICD-10-CM

## 2025-08-05 DIAGNOSIS — Z82.3 FAMILY HISTORY OF STROKE: ICD-10-CM

## 2025-08-05 DIAGNOSIS — Z82.49 FAMILY HISTORY OF PREMATURE CORONARY HEART DISEASE: ICD-10-CM

## 2025-08-05 DIAGNOSIS — R07.89 ATYPICAL CHEST PAIN: Primary | ICD-10-CM

## 2025-08-05 LAB
DLCOCOR-%PRED-PRE: 99 %
DLCOCOR-PRE: 17.8 ML/MIN/MMHG
DLCOUNC-%PRED-PRE: 100 %
DLCOUNC-PRE: 18.03 ML/MIN/MMHG
DLCOUNC-PRED: 17.88 ML/MIN/MMHG
ERV-%PRED-PRE: 84 %
ERV-PRE: 0.57 L
ERV-PRED: 0.67 L
EXPTIME-PRE: 7.29 SEC
FEF2575-%PRED-PRE: 143 %
FEF2575-PRE: 2.6 L/SEC
FEF2575-PRED: 1.82 L/SEC
FEFMAX-%PRED-PRE: 104 %
FEFMAX-PRE: 5.78 L/SEC
FEFMAX-PRED: 5.52 L/SEC
FEV1-%PRED-PRE: 119 %
FEV1-PRE: 2.4 L
FEV1FEV6-PRE: 82 %
FEV1FEV6-PRED: 80 %
FEV1FVC-PRE: 80 %
FEV1FVC-PRED: 80 %
FEV1SVC-PRE: 81 L
FEV1SVC-PRED: 69 L
FIFMAX-PRE: 3.08 L/SEC
FRCPLETH-%PRED-PRE: 92 %
FRCPLETH-PRE: 2.24 L
FRCPLETH-PRED: 2.42 L
FVC-%PRED-PRE: 118 %
FVC-PRE: 2.98 L
FVC-PRED: 2.52 L
GAW-PRED: 1.03 L/S/CMH2O
IC-%PRED-PRE: 111 %
IC-PRE: 2.38 L
IC-PRED: 2.13 L
Lab: 100 %
RVPLETH-%PRED-PRE: 100 %
RVPLETH-PRE: 1.67 L
RVPLETH-PRED: 1.66 L
SGAW-PRED: 0.2 1/CMH2O*S
SRAW-PRED: < 4.76 CMH2O*S
TLCPLETH-%PRED-PRE: 101 %
TLCPLETH-PRE: 4.62 L
TLCPLETH-PRED: 4.53 L
VA-%PRED-PRE: 104 %
VA-PRE: 4.35 L
VC-%PRED-PRE: 102 %
VC-PRE: 2.95 L
VC-PRED: 2.89 L

## 2025-08-05 PROCEDURE — 94375 RESPIRATORY FLOW VOLUME LOOP: CPT | Performed by: INTERNAL MEDICINE

## 2025-08-05 PROCEDURE — 99213 OFFICE O/P EST LOW 20 MIN: CPT | Performed by: NURSE PRACTITIONER

## 2025-08-05 PROCEDURE — 94729 DIFFUSING CAPACITY: CPT | Performed by: INTERNAL MEDICINE

## 2025-08-05 PROCEDURE — 94150 VITAL CAPACITY TEST: CPT | Performed by: INTERNAL MEDICINE

## 2025-08-05 PROCEDURE — 94726 PLETHYSMOGRAPHY LUNG VOLUMES: CPT | Performed by: INTERNAL MEDICINE

## 2025-08-05 ASSESSMENT — PAIN SCALES - GENERAL: PAINLEVEL_OUTOF10: NO PAIN (0)

## 2025-08-28 ENCOUNTER — TRANSFERRED RECORDS (OUTPATIENT)
Dept: HEALTH INFORMATION MANAGEMENT | Facility: CLINIC | Age: 65
End: 2025-08-28
Payer: COMMERCIAL

## 2025-09-02 LAB — CV ZIO PRELIM RESULTS: NORMAL

## (undated) DEVICE — INTRO GLIDESHEATH SLENDER 6FR 10X45CM 60-1060

## (undated) DEVICE — KIT HAND CONTROL ACIST 014644 AR-P54

## (undated) DEVICE — KIT ENDO TURNOVER/PROCEDURE CARRY-ON 101822

## (undated) DEVICE — TUBING PRESSURE 30"

## (undated) DEVICE — Device

## (undated) DEVICE — MANIFOLD KIT ANGIO AUTOMATED 014613

## (undated) DEVICE — LUBRICATING JELLY 4.25OZ

## (undated) DEVICE — TUBING SUCTION 12"X1/4" N612

## (undated) DEVICE — FASTENER CATH BALLOON CLAMPX2 STATLOCK 0684-00-493

## (undated) DEVICE — PACK HEART LEFT CUSTOM PC15OT92B

## (undated) DEVICE — SLEEVE TR BAND RADIAL COMPRESSION DEVICE 24CM TRB24-REG

## (undated) DEVICE — GLOVE EXAM NITRILE LG

## (undated) DEVICE — SOL WATER IRRIG 1000ML BOTTLE 07139-09

## (undated) RX ORDER — PROPOFOL 10 MG/ML
INJECTION, EMULSION INTRAVENOUS
Status: DISPENSED
Start: 2022-04-29

## (undated) RX ORDER — DIPHENHYDRAMINE HYDROCHLORIDE 50 MG/ML
INJECTION, SOLUTION INTRAMUSCULAR; INTRAVENOUS
Status: DISPENSED
Start: 2025-07-22

## (undated) RX ORDER — FENTANYL CITRATE 50 UG/ML
INJECTION, SOLUTION INTRAMUSCULAR; INTRAVENOUS
Status: DISPENSED
Start: 2025-07-22

## (undated) RX ORDER — LIDOCAINE HYDROCHLORIDE 20 MG/ML
INJECTION, SOLUTION EPIDURAL; INFILTRATION; INTRACAUDAL; PERINEURAL
Status: DISPENSED
Start: 2022-04-29

## (undated) RX ORDER — HEPARIN SODIUM 200 [USP'U]/100ML
INJECTION, SOLUTION INTRAVENOUS
Status: DISPENSED
Start: 2025-07-22

## (undated) RX ORDER — SODIUM CHLORIDE 9 MG/ML
INJECTION, SOLUTION INTRAVENOUS
Status: DISPENSED
Start: 2025-07-22

## (undated) RX ORDER — HEPARIN SODIUM 1000 [USP'U]/ML
INJECTION, SOLUTION INTRAVENOUS; SUBCUTANEOUS
Status: DISPENSED
Start: 2025-07-22